# Patient Record
Sex: FEMALE | Race: WHITE | Employment: FULL TIME | URBAN - METROPOLITAN AREA
[De-identification: names, ages, dates, MRNs, and addresses within clinical notes are randomized per-mention and may not be internally consistent; named-entity substitution may affect disease eponyms.]

---

## 2018-08-06 PROBLEM — IMO0001 UNCONTROLLED TYPE 1 DIABETES MELLITUS WITHOUT COMPLICATION: Status: ACTIVE | Noted: 2018-08-06

## 2018-08-06 NOTE — PROGRESS NOTES
Name: Celine Nicolas  Date: 8/6/2018    Referring Physician: No ref. provider found    HISTORY OF PRESENT ILLNESS   Celine Nicolas is a 24year old female who presents for diabetes mellitus, diagnosed at age 10.   She was previously followed by Dr. Sammy Rivera Social History    Marital status: Unknown             Spouse name:                       Years of education:                 Number of children:               Social History Main Topics    Smoking status: Never Smoker counting  -Discussed treatment options to decrease insulin requirement but significant GI side effects with GLP-1  -Would not recommend SGLT-2 until BG levels are better controlled  -Adjusted insulin pump as noted above  -Check low dose dexamethasone suppr

## 2018-08-14 NOTE — TELEPHONE ENCOUNTER
From: Kory Pond  To: Cliff Niño MD  Sent: 8/14/2018 8:58 AM CDT  Subject: Referral Request    I believe I may be pregnant, and I need a referral for an obstetrician/gynecologist that you feel will be able to best serve me as a type 1 diabetic.  I

## 2018-08-14 NOTE — TELEPHONE ENCOUNTER
Spoke with patient. She took two at home pregnancy tests yesterday and both positive. She is wearing 670G pump with sensor. She was just in for visit with [facility] 8/6.  Discussed that she will likely need return visit this week with MD or RN to evaluate BG's and

## 2018-08-15 NOTE — TELEPHONE ENCOUNTER
Please call her and tell her she needs to turn off auto mode and come in for visit ASAP to adjust pump. I will look into Ob/Gyn. Thanks.

## 2018-08-15 NOTE — TELEPHONE ENCOUNTER
Called the patient. She will turn off automode on her pump. Booked appt for tomorrow. May need to provide note for her work at the appt.

## 2018-08-16 NOTE — PROGRESS NOTES
Name: Gloria Evangelista  Date: 8/16/2018    Referring Physician: No ref. provider found    HISTORY OF PRESENT ILLNESS   Gloria Smoker is a 24year old female who presents for diabetes mellitus, diagnosed at age 10.   She was previously followed by Dr. Smita Arango (ZOFRAN) 4 mg tablet, Take 1 tablet by mouth every 4 (four) hours as needed. , Disp: , Rfl:   •  dexamethasone 1 MG Oral Tab, Take 1 tablet (1 mg total) by mouth daily. , Disp: 1 tablet, Rfl: 0     Allergies:   No Known Allergies    Social History:   Social complications of diabetes include retinopathy, neuropathy, nephropathy and cardiovascular disease  -Discussed importance of SBGM  -Discussed importance of CHO counting  -Adjusted insulin pump as noted above  -Discussed importance of f/u with gynecology   -

## 2018-08-22 NOTE — TELEPHONE ENCOUNTER
Reports reviewed by [facility] Randle Lalita changed as follows:    Basals    12A- 1.70-->1.80  4A- 1.80-->1.95  10A- 1.60-->1.80  4P- 1.50-->1.60  6P- 1.85-->2.00  8P- 1.65    I:INDRA   12A- 4.5  11A- 3.5  5P- 3.5      Emailed patient with changes and advise she contact

## 2018-09-06 NOTE — TELEPHONE ENCOUNTER
Not sure about lab location I can direct her to look up [facility] labs to see if closer see other requests regarding testing.

## 2018-09-06 NOTE — TELEPHONE ENCOUNTER
I thought she was pregnant? If yes then we should not check her estrogen level. Also lets get a pump download to look at her sugars. Ok to check thyroid US. Thanks.

## 2018-09-06 NOTE — TELEPHONE ENCOUNTER
From: David Burgess  To:  Harriet Hillman MD  Sent: 9/6/2018 2:31 PM CDT  Subject: Test Results Question    Dr. Taym Markham,    I know we ordered a cortisol test that I have yet to take, but I was wondering if I can add an estrogen level test to that blood

## 2018-09-11 NOTE — TELEPHONE ENCOUNTER
From: Nicolas Rogers  To: Daniel Taylor MD  Sent: 9/11/2018 9:06 AM CDT  Subject: Non-Urgent Medical Question    I have uploaded to [facility]. Please let me know if you recommend any changes - I have been experiencing some lows in the middle of the night.

## 2018-10-09 NOTE — TELEPHONE ENCOUNTER
Printed patient's data from [facility] and left on [facility] desk for review.

## 2018-10-09 NOTE — TELEPHONE ENCOUNTER
From: Mary Jane Locke  To: Beverly Clinton MD  Sent: 10/9/2018 1:19 PM CDT  Subject: Visit Follow-up Question    Hello!   I have uploaded to [employer], but please note I forgot to bolus several times recently - and that should explain the couple of out-of-no-

## 2018-10-10 NOTE — TELEPHONE ENCOUNTER
Pt has current orders to be seen at [facility], pt is requesting new orders for [facility] for US Thyroid :[employer] and  [employer] for [employer] on [address] [facility], \[facility] [place] [facility]\"" call at:[phone],thanks.

## 2018-10-10 NOTE — TELEPHONE ENCOUNTER
Spoke with patient to clarify. She is requesting mailed copies of lab orders for [facility] and thyroid US order. Will also fax orders to [address] [place] [address] in [facility] as requested.

## 2018-10-22 NOTE — PROGRESS NOTES
US of the Head and NEck indicate that there is an mildly enlarged right thyroid by size and line and a normal Left thyroid Possible mild/early goiter. Recommendations are for follow up Ultrasound in one year, no biopsy at this time.  The patient was notifie

## 2018-10-24 NOTE — PROGRESS NOTES
Good Morning Zoran Steen,    I reviewed your Insulin pump download with Dr Ernie Sanchez and suggest the following changes. 1. Decrease your Active Insulin Time to 2:30 (from 3:00 currently)    2.  Decrease the 11:00 Carbohydrate Ratio from 3.5 to 3   Keep the 0:00 a

## 2018-10-26 NOTE — TELEPHONE ENCOUNTER
See patient message below. Camden Hedrick I do see note on thyroid report that you released results to [employer] for patient for thyroid ultrasound but I couldn't find the exact message. What was interpretation of scan and I can let patient know. Thanks.

## 2018-10-31 NOTE — TELEPHONE ENCOUNTER
Patient message. She was requesting thyroid levels which have been ordered and estrogen but she mentioned also cortisol and I don't see that ordered does she need cortisol level? Will print pump report also as requested.

## 2018-10-31 NOTE — PROGRESS NOTES
I have ordered the Thyroid panel and Estradiol labs, I do not see a cortisol test from Dr Wu Petersen.    Thank you

## 2018-11-07 NOTE — PROGRESS NOTES
Name: Mark Yeung  Date: 11/7/2018    Referring Physician: No ref. provider found    HISTORY OF PRESENT ILLNESS   Mark Yeung is a 34year old female who presents for diabetes mellitus, diagnosed at age 10.   She was previously followed by Dr. Dede Mejia daily., Disp: 1 tablet, Rfl: 0     Allergies:   No Known Allergies    Social History:   Social History    Socioeconomic History      Marital status: Single      Spouse name: Not on file      Number of children: Not on file      Years of education: Not on f 1. Diabetes Mellitus Type 1, controlled  -controlled, HgA1c 6.8%   -Congratulated patient on significantly improved BG levels   -Discussed importance of glycemic control to prevent fetal complications and macrosomia   -Discussed importance of glycemic

## 2019-01-02 NOTE — TELEPHONE ENCOUNTER
Current Outpatient Medications:  HUMALOG 100 UNIT/ML Subcutaneous Solution Inject 60-70 Units into the skin daily.  Disp: 70 mL Rfl: 1     Med not covered preferred is Novolin pls call [phone] for PA Pt ID# [id number]

## 2019-01-02 NOTE — TELEPHONE ENCOUNTER
From: Marisol Mcneill  To: Eriberto Abernathy MD  Sent: 1/2/2019 4:19 PM CST  Subject: Prescription Question    I need to fill my insulin prescription as soon as possible. The only insulin I have is currently in my pump.      I have been working all day to get

## 2019-01-02 NOTE — TELEPHONE ENCOUNTER
Spoke with patient. 1) She states she uses up to 100 units of Humalog daily. Requires new script sent to [facility]. Called in script. Pharmacy states Novolog is preferred brand now with patient's new insurance.      Patient declines to switch Rx to [facility] [employer]

## 2019-01-02 NOTE — TELEPHONE ENCOUNTER
Pt requesting refill for rx:Humalog and rx:Test Strips, 90 day supply, pt just switched to [employer], update ins, pls call at:[phone],thanks.   *confirmed default [facility]/pharm    Current Outpatient Medications:   •  HUMALOG 100 UNIT/ML Subcutaneous Solut

## 2019-01-03 NOTE — TELEPHONE ENCOUNTER
Patient contacted office per message below. Will contact insurance regarding PA with new information. \"From: Shanel Cosby  To:  Charity Forde MD  Sent: 1/3/2019  8:02 AM CST  Subject: Prescription Question    I was not able to reply to the Prior Aut

## 2019-01-03 NOTE — TELEPHONE ENCOUNTER
[employer] [facility] contacted and stts that clinical review will need additional information sent in fax.  Fax sent with 11/7/18 OV noting Metformin and Victoza intolerance, and cover letter with information (pt maybe pregnant) SX#[id number], pt name

## 2019-01-03 NOTE — TELEPHONE ENCOUNTER
Per covermymeds, Humalog approved. Effective from 01/02/2019 through 01/02/2020. Sent message to patient letting her know. SH - patient has follow up scheduled 2/6/19. Ok to keep given pos preg test today - or does she need to be seen sooner?

## 2019-01-07 NOTE — TELEPHONE ENCOUNTER
Contacted plan to get authorization for contour next test strips. Plan will be faxing form to our office. Appears contour next strips are covered but she needs PA for the quantity.  Will await form

## 2019-01-07 NOTE — TELEPHONE ENCOUNTER
Spoke with patient. She plans to terminate pregnancy. States she has an appt on Friday with OB. She has follow up with [facility] on 2/6/19.

## 2019-01-09 NOTE — TELEPHONE ENCOUNTER
RN called [employer] - we did not receive fax for quantity override. They will refax form. Will await form. Patient notified.

## 2019-02-06 NOTE — PROGRESS NOTES
Name: Irineo De Los Santos  Date: 2/6/2019    Referring Physician: No ref. provider found    HISTORY OF PRESENT ILLNESS   Irineo De Los Santos is a 34year old female who presents for diabetes mellitus, diagnosed at age 10.   She was previously followed by Dr. Shahrzad Ozuna Solution, Inject 60-70 Units into the skin daily. , Disp: 70 mL, Rfl: 1  •  Ondansetron HCl (ZOFRAN) 4 mg tablet, Take 1 tablet by mouth every 4 (four) hours as needed. , Disp: , Rfl:   •  Pantoprazole Sodium 40 MG Oral Tab EC, Take 40 mg by mouth daily.   , improved BG levels   -Overall sugars continue to improve with increased use of auto mode   -Discussed importance of glycemic control to prevent complications of diabetes  -Discussed complications of diabetes include retinopathy, neuropathy, nephropathy and

## 2019-02-12 NOTE — TELEPHONE ENCOUNTER
From: Allyson Capellan  To: Vandana Romeo MD  Sent: 2/12/2019 9:25 AM CST  Subject: Visit Follow-up Question    I have uploaded my numbers to [email or web address] for review.     I was not able to have the CGM on as many days as I would like since our visit, but it is

## 2019-02-12 NOTE — TELEPHONE ENCOUNTER
C/o hypoglycemia since starting phentermine 2/11/19. BGs sent via [facility] - will bring to [facility] on Wed for you to review.

## 2019-02-13 NOTE — PROGRESS NOTES
Download reviewed with Dr Ever Bhardwaj  Patient notified by Mychart of the following pump changes     Basal rate changes  00:00 1.55 --- Decrease to 1.45  20:00  1.55----Decrease to 1.45  Remaining  basal rates unchanged      Carbohydrate ratio   17:00 2.5----->D

## 2019-03-19 NOTE — TELEPHONE ENCOUNTER
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  Kayleigh Purvis is very strict about electronic devices in the testing center, so I am going to line up an \

## 2019-03-20 NOTE — TELEPHONE ENCOUNTER
Pt requesting testing accommodations letter for DM1 - advised pt letter would be provided 3/21.     Routed to [facility] - please advise

## 2019-05-08 NOTE — PROGRESS NOTES
Name: Zaki Montgomery  Date: 5/8/2019    Referring Physician: No ref. provider found    HISTORY OF PRESENT ILLNESS   Zaki Montgomery is a 34year old female who presents for diabetes mellitus, diagnosed at age 10.   She was previously followed by Dr. Aly Resides 8-10 times daily. , Disp: 600 each, Rfl: 1  •  HUMALOG 100 UNIT/ML Subcutaneous Solution, Inject 60-70 Units into the skin daily. , Disp: 70 mL, Rfl: 1  •  Pantoprazole Sodium 40 MG Oral Tab EC, Take 40 mg by mouth daily.   , Disp: , Rfl:   •  Ondansetron HCl 1, controlled  -controlled, HgA1c 6.8% -->improved  -Congratulated patient on significantly improved BG levels   -Overall sugars continue to improve with increased use of auto mode   -Discussed importance of glycemic control to prevent complications of fifi

## 2019-06-21 NOTE — TELEPHONE ENCOUNTER
From: Gildardo Berg  To:  Charly Wang MD  Sent: 6/20/2019 12:28 PM CDT  Subject: Non-Urgent Medical Question    Hi Dr. Gautam Barahona,  I have just uploaded my numbers to [facility] and would like us to adjust my manual mode rates and ratios to better reflect [facility]

## 2019-06-21 NOTE — TELEPHONE ENCOUNTER
Please see pump download on your desk and read [facility] below.  thanks

## 2019-06-27 NOTE — TELEPHONE ENCOUNTER
Attempted to call pt to verify she received the [facility] msg but mailbox is full.

## 2019-08-14 NOTE — PROGRESS NOTES
Name: Su Gary  Date: 8/14/2019    Referring Physician: No ref. provider found    HISTORY OF PRESENT ILLNESS   Su Gary is a 34year old female who presents for diabetes mellitus, diagnosed at age 10.   She was previously followed by Dr. Aureliano Guerrero by mouth every morning., Disp: 30 capsule, Rfl: 2  •  Glucose Blood (CONTOUR NEXT TEST) In Vitro Strip, Check sugars 8-10 times daily. , Disp: 600 each, Rfl: 1  •  Pantoprazole Sodium 40 MG Oral Tab EC, Take 40 mg by mouth daily.   , Disp: , Rfl:   •  Ondans on significantly improved BG levels   -Overall sugars continue to improve with increased use of auto mode   -Discussed importance of glycemic control to prevent complications of diabetes  -Discussed complications of diabetes include retinopathy, neuropathy

## 2019-11-20 NOTE — PROGRESS NOTES
Name: Irving Cruz  Date: 11/20/2019    Referring Physician: No ref. provider found    HISTORY OF PRESENT ILLNESS   Irving Cruz is a 34year old female who presents for diabetes mellitus, diagnosed at age 10.   She was previously followed by Dr. Caterina Pang morning., Disp: 30 capsule, Rfl: 2  •  Insulin Lispro (HUMALOG) 100 UNIT/ML Subcutaneous Solution, INJECT UP  UNITS EVERY DAY, Disp: 90 mL, Rfl: 1  •  Glucose Blood (CONTOUR NEXT TEST) In Vitro Strip, Check sugars 8-10 times daily. , Disp: 600 each, R Type 1, controlled  -controlled, HgA1c 6.8% -->improved   -Congratulated patient on significantly improved BG levels   -Overall sugars continue to improve with increased use of auto mode   -Discussed importance of glycemic control to prevent complications

## 2019-12-09 NOTE — TELEPHONE ENCOUNTER
From: Allyson Capellan  To: Quique Han MD  Sent: 12/9/2019 8:32 AM CST  Subject: Prescription Question    Hi Dr. Dannielle Griffin,     I received this letter on Friday. Can you help me get my Humalog prescription approved for another year?  I would hate to lose all

## 2020-03-02 NOTE — TELEPHONE ENCOUNTER
bre    Current Outpatient Medications:    •  Insulin Lispro (HUMALOG) 100 UNIT/ML Subcutaneous Solution, INJECT UP  UNITS EVERY DAY, Disp: 90 mL, Rfl: 1

## 2020-03-30 NOTE — TELEPHONE ENCOUNTER
From: Shanel Cosby  To: Nicky Radford MD  Sent: 3/30/2020 12:15 PM CDT  Subject: Non-Urgent Medical Question    Hi Dr. 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

## 2020-06-22 NOTE — TELEPHONE ENCOUNTER
Prescription refill request:    LOV 11/20/19. Follow up 8/10/20. Refilled prescription per protocol.

## 2020-06-22 NOTE — TELEPHONE ENCOUNTER
From: Parvez Acevedo  To: Juli Patel MD  Sent: 6/22/2020 11:16 AM CDT  Subject: Prescription Question    My [facility] does not have the prescription for my test strips that was renewed in May - can you send it again so I can refill?     Thanks,  Shanique Hernandez

## 2020-07-10 NOTE — TELEPHONE ENCOUNTER
From: Celine Nicolas  To: Tarsha Denise MD  Sent: 7/10/2020 12:40 PM CDT  Subject: Prescription Question    Hi Dr. Brent Valadez & Team - I realized yesterday that my glucagon is . Can you send a new script to [facility] so I can pick it up asap?  I am going

## 2020-07-14 NOTE — TELEPHONE ENCOUNTER
Received fax 7/2/20 from [employer] requesting medical records. Faxed to [phone]. C-peptide was never drawn, so unable to send. Letter of Medical Necessity was not faxed to office. Note on fax cover sheet, requesting letter to be faxed to office.

## 2020-07-28 NOTE — TELEPHONE ENCOUNTER
LMTCB, Sent [employer] message. Need to confirm which pump model pt is on prior to sending fax.

## 2020-08-10 NOTE — PROGRESS NOTES
Name: Jess Patel  Date: 8/10/2020    Referring Physician: No ref. provider found    HISTORY OF PRESENT ILLNESS   Jess Patel is a 34year old female who presents for diabetes mellitus, diagnosed at age 10.   She was previously followed by Dr. Arabella Osborne Number of children: Not on file      Years of education: Not on file      Highest education level: Not on file    Tobacco Use      Smoking status: Never Smoker      Smokeless tobacco: Never Used    Substance and Sexual Activity      Alcohol use:  Yes 8/10/2020  Bashir Kaplan MD

## 2020-08-24 NOTE — TELEPHONE ENCOUNTER
Spoke with Kiko Alejandro from [employer] and notified her that CMN was faxed just last week Thursday on 08/20/20. She states normally they also scan the form and maybe it has not been scanned yet.   They will follow up on it and will reach out to the patient if CMN

## 2020-08-24 NOTE — TELEPHONE ENCOUNTER
From: Doe Garcia  To: Jeff Mejia MD  Sent: 8/20/2020 12:48 PM CDT  Subject: Prescription Question    Hi all,    I have received a few emails from [employer] saying they are working with you to get a Certificate of Medical Necessity for one of my ord

## 2020-08-27 NOTE — TELEPHONE ENCOUNTER
Hubert Nurse from [address] [place] [address] states they did not receive the CMN form can you please re fax # [phone]

## 2020-08-28 NOTE — TELEPHONE ENCOUNTER
CMN form not received the first time but was re faxed yesterday morning. I contacted [employer] and confirmed the form has been received.

## 2020-08-28 NOTE — TELEPHONE ENCOUNTER
From: Celine Nicolas  To: Tarsha Denise MD  Sent: 8/27/2020 10:54 AM CDT  Subject: Prescription Question    Hi again! I received another email from [employer] saying they need a Certificate of Medical Necessity from you.  Did they reject the one you sent

## 2020-11-04 NOTE — TELEPHONE ENCOUNTER
•  Glucose Blood (CONTOUR NEXT TEST) In Vitro Strip, CHECK BLOOD SUGAR 8-10 TIMES DAILY, Disp: 600 strip, Rfl: 0

## 2021-01-21 NOTE — TELEPHONE ENCOUNTER
From: Jess Patel  To: Lynn Park MD  Sent: 1/21/2021 9:43 AM CST  Subject: Visit Follow-up Question    Hi Dr. Luisito Smith & Staff! I hope you are staying safe!  I have an appointment on 2/10, and I wanted to check to see if I should plan on getting blood

## 2021-02-10 NOTE — PROGRESS NOTES
Name: Doe Garcia  Date: 2/10/2021    Referring Physician: No ref. provider found    HISTORY OF PRESENT ILLNESS   Doe Garcia is a 31year old female who presents for diabetes mellitus, diagnosed at age 10.   She was previously followed by Dr. Reymundo Colon Socioeconomic History      Marital status:       Spouse name: Not on file      Number of children: Not on file      Years of education: Not on file      Highest education level: Not on file    Tobacco Use      Smoking status: Never Smoker      Smoke

## 2021-02-11 NOTE — TELEPHONE ENCOUNTER
From: Eulalia Thomas  To: Hortencia Huber MD  Sent: 2/11/2021 12:43 PM CST  Subject: Visit Follow-up Question    Hi - the website won't let me respond directly to the message about the physician form - but I found out how you can send it.  Please fax to [phone]-

## 2021-02-11 NOTE — TELEPHONE ENCOUNTER
Patient left form to be filled out : [facility] physician lab from to be filled by our physician.   Done   rn called patient left mess and sent mychart asking what we need to do with form

## 2021-03-17 NOTE — TELEPHONE ENCOUNTER
•  Insulin Lispro (HUMALOG) 100 UNIT/ML Subcutaneous Solution, INJECT UP  UNITS UNDER THE SKIN EVERY DAY, Disp: 90 mL, Rfl: 1    Pharmacy comments: the following pharmacist-recommended medication therapy changes are being requested on behalf of t

## 2021-03-17 NOTE — TELEPHONE ENCOUNTER
Spoke to pharmacy regarding message below - per pharmacy, patient has not picked RX sent 3/8/21 - pharmacist states humalog is covered by patient's insurance and cost of humalog is d/t deductible

## 2021-06-30 NOTE — TELEPHONE ENCOUNTER
Pharmacy refill request for:      •  Glucose Blood (CONTOUR NEXT TEST) In Vitro Strip, CHECK BLOOD SUGAR 8-10 TIMES DAILY, Disp: 600 strip, Rfl: 0      MESSAGE:   Plan does not cover this medication.  Please call plan at [phone] to initiate prior autho

## 2021-07-01 NOTE — TELEPHONE ENCOUNTER
PA approved. Sanchez, [phone]. Spoke with Edmundo/pharmacist.  Went through.    My chart message sent to patient of approval.

## 2021-07-01 NOTE — TELEPHONE ENCOUNTER
Medication PA Requested:  Glucose Blood (CONTOUR NEXT TEST) In Vitro Strip, Disp: 600 strip, Rfl: 0             CoverMyMeds Used:  Key: [id number]  Sig:CHECK BLOOD SUGAR 8-10 TIMES DAILY   DX Code: E10.9           Submitted with office note and A1c of 2/10/2

## 2021-07-01 NOTE — TELEPHONE ENCOUNTER
Medication PA Requested:  Glucose Blood (CONTOUR NEXT TEST) In Vitro Strip, Disp: 600 strip, Rfl: 0                                             CoverMyMeds Used:  Key:  Sig:CHECK BLOOD SUGAR 8-10 TIMES DAILY   DX Code: E10.9

## 2021-07-02 NOTE — TELEPHONE ENCOUNTER
From: Nicolas Rogers  To: Rubio Luis MD  Sent: 7/2/2021 1:06 PM CDT  Subject: Prescription Question    I received this message: Good Afternoon Ms. Peña,   We contacted your benefit plan and received approval of prior authorization for Contour Next Test

## 2021-07-26 NOTE — TELEPHONE ENCOUNTER
Form received regarding CMN for pump replacement and CGM. Completed and placed on provider's desk for signature.

## 2021-07-27 NOTE — TELEPHONE ENCOUNTER
Spoke to patient in regard to insulin pump below.     Basal:  12A 0.975 -->1.05  4A 1.60 -->1.70  10A 1.40 -->1.50  4P 1.30 -->1.40  8P 1.00 -->1.10     Bolus:  I:CR   12A 6.5  11A 5.0 -->5.5  5P 5.0      Sensitivity  12A 24  9P 28     Blood Glucose Target

## 2021-07-27 NOTE — TELEPHONE ENCOUNTER
Pt is out of Insulin and is calling to check status on the pens for back up.  Please call pt before end of day  if does not get insulin will have to go to ER

## 2021-07-27 NOTE — TELEPHONE ENCOUNTER
Pt is on vacation now and states her pump broke - asking for rx for insulin pens and needles to be sent to [facility] in [facility] - phone [phone]

## 2021-07-27 NOTE — TELEPHONE ENCOUNTER
Hi!  I made a change. The total basal is 33 units. Thank you! Please ask her to send us her blood sugars after a couple of days, and let us trouble shoot her pump issue. Thank you!

## 2021-07-27 NOTE — TELEPHONE ENCOUNTER
Dolly Chen Body    Can you please review my insulin conversion math prior to sending in prescription.  Calculations based off of Dr. Samia Bolaños note 2/10/21

## 2021-07-27 NOTE — TELEPHONE ENCOUNTER
Called patient and relayed below message from Dr. Racheal Dasilva as outlined. However, upon looking at the script sent it had a direction to inject 40 units daily. Discussed  with on call provider to clarify.   Received the following recommendation:     \"S

## 2021-08-18 NOTE — TELEPHONE ENCOUNTER
Patient was scheduled to see Dr. Charissa Brambila in [address] [place] [address] office but wasn't due to power outage. Please call to reschedule, no opening slots available.

## 2021-08-25 NOTE — PROGRESS NOTES
Name: Irineo De Los Santos  Date: 8/25/2021    Referring Physician: No ref. provider found    HISTORY OF PRESENT ILLNESS   Irineo De Los Santos is a 31year old female who presents for diabetes mellitus, diagnosed at age 10.   She was previously followed by Dr. Vinod Hughes (LANTUS SOLOSTAR) 100 UNIT/ML Subcutaneous Solution Pen-injector, Inject 40 Units into the skin nightly.  (Patient not taking: Reported on 8/25/2021 ), Disp: 12 mL, Rfl: 0  •  Glucose Blood (CONTOUR NEXT TEST) In Vitro Strip, CHECK BLOOD SUGAR 8-10 TIMES DA importance of CHO counting  -Adjusted insulin pump as noted above   -Normal lipids  -Normal foot exam performed today  -Normal thyroid levels   -UTD with optho  -Normal renal and liver function  -Normotensive  -She is interested in fertility and discussed

## 2021-11-30 NOTE — PROGRESS NOTES
HPI:   Marino Villa is a 36year old female who presents for a complete physical exam.    Patient is new to me. Sees Dr. Hermelindo Myers for type 1 diabetes. Patient reports that is very well controlled using insulin pump.   She and her  are trying to ge Use      Vaping Use: Never used    Alcohol use: Yes      Comment: rarely    Drug use: Yes      Types: Cannabis         REVIEW OF SYSTEMS:   GENERAL: feels well otherwise  SKIN: denies any skin lesions  EYES:denies blurred vision or double vision  HEENT: de MICROALB/CREAT RATIO, RANDOM URINE;  Future  - TSH W REFLEX TO FREE T4; Future  - VITAMIN D, 25-HYDROXY  - CBC WITH DIFFERENTIAL WITH PLATELET  - COMP METABOLIC PANEL (14)  - LIPID PANEL  - MICROALB/CREAT RATIO, RANDOM URINE  - TSH W REFLEX TO FREE T4

## 2022-01-12 NOTE — PROGRESS NOTES
Name: Tamir Pollard  Date: 1/12/2022    Referring Physician: No ref. provider found    HISTORY OF PRESENT ILLNESS   Tamir Pollard is a 36year old female who presents for diabetes mellitus, diagnosed at age 10.      She is now 5 and 1/2 weeks pregnant reported), Disp: 3 mL, Rfl: 0  •  Insulin Pen Needle (CAREFINE PEN NEEDLES) 32G X 5 MM Does not apply Misc, Use to inject insulin as directed, Disp: 100 each, Rfl: 0  •  insulin glargine (LANTUS SOLOSTAR) 100 UNIT/ML Subcutaneous Solution Pen-injector, Inj SBGM  -Discussed importance of CHO counting  -Adjusted insulin pump as noted above   -Normal lipids  -Normal foot exam performed 8/2021  -Normal thyroid levels   -UTD with optho - discussed new appt during pregnancy   -Normal renal and liver function  -Nor

## 2022-01-24 NOTE — TELEPHONE ENCOUNTER
From: Jordan England  To: Kristina Keita MD  Sent: 1/21/2022 11:07 AM CST  Subject: Veryl Kinds upload    Hi Dr. Saul Granados,    I have uploaded my [employer] data for you to review. Let me know if it did not work or if you have any changes for me. Thanks!   Eric Lipscomb

## 2022-01-24 NOTE — TELEPHONE ENCOUNTER
Dr. Unique Zazueta    Please advise if you would like us to print download for you. Next office visit 2/2/22.

## 2022-04-28 NOTE — TELEPHONE ENCOUNTER
From: Emma Phillips  To: Charlynn Fothergill, MD  Sent: 4/28/2022 12:55 PM CDT  Subject: Test Strips Prescription    Hello,    I am going to run out of test strips early this month. I have been experiencing a lot more lows and so I have needed to test a lot more lately. Is there a way for you to help me request a refill early? I sent it to [facility] for refill and it told me it was too early for the insurance, but I am on my last 100 strips and it says I can't refill until 5/30. I will go through that in 10 days or so. Thanks!!   Devante Castle

## 2022-04-28 NOTE — TELEPHONE ENCOUNTER
PA team, please submit for testing supplies, thank you!     Medication PA Requested: Accu-Check Guide Testing Strips                                                        CoverMyMeds Used:  Key:  Quantity: 900  Day Supply: 90 days  Sig: Check Blood Sugar 10 times daily  DX Code:  E10.9                                   CPT code (if applicable):   Case Number/Pending Ref#:  Rationale:  - Pt has type 1 diabetes  - Pt on insulin pump which insulin can cause low blood sugar  - Pt has been checking blood sugar 10 times per day due to fluctuating blood sugars  - Pt has a history of low blood sugar requiring frequent checking  - A1c of 6.2% on 1/12/2022

## 2022-04-28 NOTE — TELEPHONE ENCOUNTER
Spoke with pharmacist. Saadia Leroy was preferred. Does not require PA. Costs around $300 for 90 day supply. Without being covered, cost around $600. Related to high deductible plan. Responded to pt's mychart message. Additionally, offered [employer] as a cheaper option.     Of note, pharmacist updated signature to 10 times per day

## 2022-05-11 ENCOUNTER — OFFICE VISIT (OUTPATIENT)
Dept: ENDOCRINOLOGY CLINIC | Facility: CLINIC | Age: 32
End: 2022-05-11
Payer: COMMERCIAL

## 2022-05-11 VITALS
BODY MASS INDEX: 34 KG/M2 | DIASTOLIC BLOOD PRESSURE: 82 MMHG | HEART RATE: 74 BPM | SYSTOLIC BLOOD PRESSURE: 121 MMHG | WEIGHT: 232 LBS

## 2022-05-11 DIAGNOSIS — E10.65 UNCONTROLLED TYPE 1 DIABETES MELLITUS WITH HYPERGLYCEMIA (HCC): Primary | ICD-10-CM

## 2022-05-11 LAB
CARTRIDGE LOT#: ABNORMAL NUMERIC
GLUCOSE BLOOD: 146
HEMOGLOBIN A1C: 6.1 % (ref 4.3–5.6)
TEST STRIP LOT #: NORMAL NUMERIC

## 2022-05-11 PROCEDURE — 99213 OFFICE O/P EST LOW 20 MIN: CPT | Performed by: INTERNAL MEDICINE

## 2022-05-11 PROCEDURE — 3074F SYST BP LT 130 MM HG: CPT | Performed by: INTERNAL MEDICINE

## 2022-05-11 PROCEDURE — 36416 COLLJ CAPILLARY BLOOD SPEC: CPT | Performed by: INTERNAL MEDICINE

## 2022-05-11 PROCEDURE — 82947 ASSAY GLUCOSE BLOOD QUANT: CPT | Performed by: INTERNAL MEDICINE

## 2022-05-11 PROCEDURE — 3079F DIAST BP 80-89 MM HG: CPT | Performed by: INTERNAL MEDICINE

## 2022-05-11 PROCEDURE — 3044F HG A1C LEVEL LT 7.0%: CPT | Performed by: INTERNAL MEDICINE

## 2022-05-11 PROCEDURE — 83036 HEMOGLOBIN GLYCOSYLATED A1C: CPT | Performed by: INTERNAL MEDICINE

## 2022-06-16 ENCOUNTER — TELEPHONE (OUTPATIENT)
Dept: ENDOCRINOLOGY CLINIC | Facility: CLINIC | Age: 32
End: 2022-06-16

## 2022-06-16 NOTE — TELEPHONE ENCOUNTER
Received fax from [employer]. Attached is a prescription for pump supplies. Placed on provider desk for review and signature.

## 2022-06-17 ENCOUNTER — TELEPHONE (OUTPATIENT)
Dept: ENDOCRINOLOGY CLINIC | Facility: CLINIC | Age: 32
End: 2022-06-17

## 2022-06-17 NOTE — TELEPHONE ENCOUNTER
Medication  CGM  pump supply Requested: Glucose Blood (CONTOUR NEXT TEST) In Vitro Strip    Sig: CHECK BLOOD SUGAR 8-10 TIMES DAILY    DX Code: E10.65                                       Case Number/Pending Ref#:

## 2022-06-17 NOTE — TELEPHONE ENCOUNTER
Glucose Blood (CONTOUR NEXT TEST) In Vitro Strip, CHECK BLOOD SUGAR 8-10 TIMES DAILY, Disp: 600 strip, Rfl: 0    Key: Olive Inch

## 2022-06-20 NOTE — TELEPHONE ENCOUNTER
Medication PA Requested:Glucose Blood (CONTOUR NEXT TEST) In Vitro Strip                                                          CoverMyMeds Used:  Key:Key: [id number]  Quantity:600 strip  Day Supply:  Sig:CHECK BLOOD SUGAR 8-10 TIMES DAILY   DX Code:                                         Faxed [facility] PA form with OV/A1c 5/11/2022 and notation are compatible with pump. Awaiting determination.

## 2022-06-23 RX ORDER — BLOOD SUGAR DIAGNOSTIC
STRIP MISCELLANEOUS
Qty: 900 STRIP | Refills: 1 | Status: SHIPPED | OUTPATIENT
Start: 2022-06-23

## 2022-06-27 ENCOUNTER — PATIENT MESSAGE (OUTPATIENT)
Dept: ENDOCRINOLOGY CLINIC | Facility: CLINIC | Age: 32
End: 2022-06-27

## 2022-06-27 NOTE — TELEPHONE ENCOUNTER
From: Margarita Gauthier  To: Taya Owens MD  Sent: 6/27/2022 10:19 AM CDT  Subject: 5 weeks pregnant    Hi Dr. Lyn Books,  I just got a positive test. I am about 5 weeks 5 days according to my tracking ap. I am going in for blood tests at the gyno today at lunch to test my HcG levels. When do you want to see me? Should we wait a bit to make sure this one sticks? I uploaded to [facility] this morning - let me know if it worked. I am noticing insulin resistance all weekend and have changed my site twice to see if that is the problem with no avail. I keep doing small boluses to try to keep my bs down, but do you have any adjustments to make? Thanks! Keep your fingers crossed for me!   Sheela Breeding

## 2022-06-28 NOTE — TELEPHONE ENCOUNTER
Dr Garay Half,    Please see below message. Additionally, currently, you have an 8:45am on Weds July 13th -- Do you want to double book?

## 2022-06-29 ENCOUNTER — PATIENT MESSAGE (OUTPATIENT)
Dept: ENDOCRINOLOGY CLINIC | Facility: CLINIC | Age: 32
End: 2022-06-29

## 2022-06-30 NOTE — TELEPHONE ENCOUNTER
The upload on [address] is under the last name Mariana. RN confirmed that the two accounts are for this patient by her new and old 's license scanned under media tab. RN updated last name on [employer]. Pump download placed on Dr. Ezra Willson desk.

## 2022-08-08 RX ORDER — INSULIN LISPRO 100 [IU]/ML
INJECTION, SOLUTION INTRAVENOUS; SUBCUTANEOUS
Qty: 90 ML | Refills: 0 | Status: SHIPPED | OUTPATIENT
Start: 2022-08-08

## 2022-08-09 ENCOUNTER — TELEPHONE (OUTPATIENT)
Dept: ENDOCRINOLOGY CLINIC | Facility: CLINIC | Age: 32
End: 2022-08-09

## 2022-08-09 NOTE — TELEPHONE ENCOUNTER
Nettie Hester is calling to ask about the letter for medical necessity needed for the patients pump supply order .   Please fax request [phone]

## 2022-08-12 NOTE — TELEPHONE ENCOUNTER
Shea Watkins is calling back again about the letter for medical necessity for pt pump supplies order    Call back number: [phone] option 2

## 2022-08-20 PROCEDURE — 3061F NEG MICROALBUMINURIA REV: CPT | Performed by: INTERNAL MEDICINE

## 2022-08-24 ENCOUNTER — OFFICE VISIT (OUTPATIENT)
Dept: ENDOCRINOLOGY CLINIC | Facility: CLINIC | Age: 32
End: 2022-08-24
Payer: COMMERCIAL

## 2022-08-24 VITALS
WEIGHT: 235 LBS | HEART RATE: 66 BPM | SYSTOLIC BLOOD PRESSURE: 121 MMHG | BODY MASS INDEX: 35 KG/M2 | DIASTOLIC BLOOD PRESSURE: 78 MMHG

## 2022-08-24 DIAGNOSIS — E10.65 UNCONTROLLED TYPE 1 DIABETES MELLITUS WITH HYPERGLYCEMIA (HCC): Primary | ICD-10-CM

## 2022-08-24 LAB
CARTRIDGE LOT#: ABNORMAL NUMERIC
GLUCOSE BLOOD: 136
HEMOGLOBIN A1C: 6.5 % (ref 4.3–5.6)
TEST STRIP LOT #: NORMAL NUMERIC

## 2022-08-24 PROCEDURE — 36416 COLLJ CAPILLARY BLOOD SPEC: CPT | Performed by: INTERNAL MEDICINE

## 2022-08-24 PROCEDURE — 3044F HG A1C LEVEL LT 7.0%: CPT | Performed by: INTERNAL MEDICINE

## 2022-08-24 PROCEDURE — 83036 HEMOGLOBIN GLYCOSYLATED A1C: CPT | Performed by: INTERNAL MEDICINE

## 2022-08-24 PROCEDURE — 99214 OFFICE O/P EST MOD 30 MIN: CPT | Performed by: INTERNAL MEDICINE

## 2022-08-24 PROCEDURE — 3078F DIAST BP <80 MM HG: CPT | Performed by: INTERNAL MEDICINE

## 2022-08-24 PROCEDURE — 82947 ASSAY GLUCOSE BLOOD QUANT: CPT | Performed by: INTERNAL MEDICINE

## 2022-08-24 PROCEDURE — 3074F SYST BP LT 130 MM HG: CPT | Performed by: INTERNAL MEDICINE

## 2022-09-13 ENCOUNTER — TELEPHONE (OUTPATIENT)
Dept: ENDOCRINOLOGY CLINIC | Facility: CLINIC | Age: 32
End: 2022-09-13

## 2022-09-13 NOTE — TELEPHONE ENCOUNTER
Received fax from [facility]. Completed the attached CMN for pump supplies and placed on providers desk for signature. Attached recent chart notes also.

## 2022-10-31 RX ORDER — BLOOD SUGAR DIAGNOSTIC
STRIP MISCELLANEOUS
Qty: 900 STRIP | Refills: 1 | Status: SHIPPED | OUTPATIENT
Start: 2022-10-31

## 2022-11-10 RX ORDER — INSULIN LISPRO 100 [IU]/ML
110 INJECTION, SOLUTION INTRAVENOUS; SUBCUTANEOUS DAILY
Qty: 90 ML | Refills: 1 | Status: SHIPPED | OUTPATIENT
Start: 2022-11-10

## 2022-11-10 NOTE — TELEPHONE ENCOUNTER
LOV: 8/24/22    Future Appointments   Date Time Provider Jerry Majano   12/21/2022 11:45 AM Angely Douglas MD [facility]

## 2022-12-21 ENCOUNTER — OFFICE VISIT (OUTPATIENT)
Dept: ENDOCRINOLOGY CLINIC | Facility: CLINIC | Age: 32
End: 2022-12-21
Payer: COMMERCIAL

## 2022-12-21 VITALS — BODY MASS INDEX: 34 KG/M2 | WEIGHT: 230 LBS

## 2022-12-21 DIAGNOSIS — E78.5 HYPERLIPIDEMIA, UNSPECIFIED HYPERLIPIDEMIA TYPE: ICD-10-CM

## 2022-12-21 DIAGNOSIS — E10.65 UNCONTROLLED TYPE 1 DIABETES MELLITUS WITH HYPERGLYCEMIA (HCC): Primary | ICD-10-CM

## 2022-12-21 LAB
CARTRIDGE LOT#: ABNORMAL NUMERIC
GLUCOSE BLOOD: 87
HEMOGLOBIN A1C: 6.1 % (ref 4.3–5.6)
TEST STRIP LOT #: NORMAL NUMERIC

## 2022-12-21 PROCEDURE — 3044F HG A1C LEVEL LT 7.0%: CPT | Performed by: INTERNAL MEDICINE

## 2022-12-21 PROCEDURE — 82947 ASSAY GLUCOSE BLOOD QUANT: CPT | Performed by: INTERNAL MEDICINE

## 2022-12-21 PROCEDURE — 99214 OFFICE O/P EST MOD 30 MIN: CPT | Performed by: INTERNAL MEDICINE

## 2022-12-21 PROCEDURE — 83036 HEMOGLOBIN GLYCOSYLATED A1C: CPT | Performed by: INTERNAL MEDICINE

## 2023-04-28 ENCOUNTER — PATIENT MESSAGE (OUTPATIENT)
Dept: ENDOCRINOLOGY CLINIC | Facility: CLINIC | Age: 33
End: 2023-04-28

## 2023-04-28 NOTE — TELEPHONE ENCOUNTER
From: Spencer Gauthier  To: Charlynn Fothergill, MD  Sent: 4/28/2023 10:05 AM CDT  Subject: 5 Weeks Pregnant    Hi Dr. Jaja Conner,    I am pregnant again - about 5 weeks. Just wanted you to know. I will go in for bloodwork monday at the gyno. Do you want to see me or should I just get my pump downloaded to [facility] so you can review? Let me know.     Devante Castle

## 2023-05-01 NOTE — TELEPHONE ENCOUNTER
Pt scheduled 5/31 2pm at [facility] (next avail res 24 spot)    Advised update in 1 week

## 2023-05-01 NOTE — TELEPHONE ENCOUNTER
Dr Sandoval Arriola,    Please see below. OK with recommendations? You also wanted to discuss when you'd see her for appt. Thanks! Pt currently on [employer] with gaurdian sensor (manual mode).  Reviewed download which overall blood sugars are steady.    - Slight rise between 3-6am; will advise to increase basal during that time to help with more in range fasting glucose  - Meal time represents a quick rise in a blood sugar sometimes reaching 200's mg/dl but does return to target range  within 2 hours; would advise to pre-bolus 15-20 minutes before rather than right before    Recommendations:    Basal  12A 1.4 --> 1.45  3A 1.45 --> 1.55  9A 1.30   12P 1.20  3P 1.15  6P 1.30    ICR  12A 7  10:30A 7.5  3P 6.5    ISF  12A 30  9P 40    Target  12A   4A   8P     Active insulin time: 2.5 hrs

## 2023-06-16 ENCOUNTER — MED REC SCAN ONLY (OUTPATIENT)
Dept: ENDOCRINOLOGY CLINIC | Facility: CLINIC | Age: 33
End: 2023-06-16

## 2023-06-30 ENCOUNTER — TELEPHONE (OUTPATIENT)
Dept: ENDOCRINOLOGY CLINIC | Facility: CLINIC | Age: 33
End: 2023-06-30

## 2023-07-17 ENCOUNTER — PATIENT MESSAGE (OUTPATIENT)
Dept: ENDOCRINOLOGY CLINIC | Facility: CLINIC | Age: 33
End: 2023-07-17

## 2023-07-17 NOTE — TELEPHONE ENCOUNTER
From: Cordelia Gauthier  To: Mari Rodriguez MD  Sent: 7/17/2023 1:30 PM CDT  Subject: [employer] Prescription Update    Hi Dr. Moose Nguyen, I keep getting voicemails and emails from [employer] saying they have contacted you regarding my pump supply prescription refills. Can you please make sure they get what they need?     Thanks, Clary Curtis

## 2023-07-19 ENCOUNTER — OFFICE VISIT (OUTPATIENT)
Dept: ENDOCRINOLOGY CLINIC | Facility: CLINIC | Age: 33
End: 2023-07-19

## 2023-07-19 VITALS
DIASTOLIC BLOOD PRESSURE: 75 MMHG | BODY MASS INDEX: 34 KG/M2 | WEIGHT: 231 LBS | SYSTOLIC BLOOD PRESSURE: 124 MMHG | HEART RATE: 67 BPM

## 2023-07-19 DIAGNOSIS — E10.65 UNCONTROLLED TYPE 1 DIABETES MELLITUS WITH HYPERGLYCEMIA (HCC): Primary | ICD-10-CM

## 2023-07-19 LAB
CARTRIDGE LOT#: ABNORMAL NUMERIC
GLUCOSE BLOOD: 108
HEMOGLOBIN A1C: 6.4 % (ref 4.3–5.6)
TEST STRIP LOT #: NORMAL NUMERIC

## 2023-07-19 PROCEDURE — 3044F HG A1C LEVEL LT 7.0%: CPT | Performed by: INTERNAL MEDICINE

## 2023-07-19 PROCEDURE — 3074F SYST BP LT 130 MM HG: CPT | Performed by: INTERNAL MEDICINE

## 2023-07-19 PROCEDURE — 99214 OFFICE O/P EST MOD 30 MIN: CPT | Performed by: INTERNAL MEDICINE

## 2023-07-19 PROCEDURE — 3078F DIAST BP <80 MM HG: CPT | Performed by: INTERNAL MEDICINE

## 2023-07-19 PROCEDURE — 82947 ASSAY GLUCOSE BLOOD QUANT: CPT | Performed by: INTERNAL MEDICINE

## 2023-07-19 PROCEDURE — 83036 HEMOGLOBIN GLYCOSYLATED A1C: CPT | Performed by: INTERNAL MEDICINE

## 2023-07-19 RX ORDER — PHENTERMINE HYDROCHLORIDE 30 MG/1
30 CAPSULE ORAL EVERY MORNING
Qty: 30 CAPSULE | Refills: 2 | Status: SHIPPED | OUTPATIENT
Start: 2023-07-19

## 2023-10-20 ENCOUNTER — TELEPHONE (OUTPATIENT)
Dept: ENDOCRINOLOGY CLINIC | Facility: CLINIC | Age: 33
End: 2023-10-20

## 2023-10-20 NOTE — TELEPHONE ENCOUNTER
Phentermine HCl 30 MG Oral Cap, Take 1 capsule (30 mg total) by mouth every morning., Disp: 30 capsule, Rfl: 1    Key:  [id number]

## 2023-10-21 ENCOUNTER — TELEPHONE (OUTPATIENT)
Dept: ENDOCRINOLOGY CLINIC | Facility: CLINIC | Age: 33
End: 2023-10-21

## 2023-10-21 NOTE — TELEPHONE ENCOUNTER
ACCU-CHEK GUIDE TEST STRIPS 100S  SIG: USE TO TEST 10 TIMES DAILY  QTY: 900  LAST REFILL: 5/23/23    Per pharmacy plan does not cover this medication. Please call plan at [phone] to initiate a prior auth or call/fax pharmacy to change medication. Patient ID # is [id number].

## 2023-10-24 RX ORDER — BLOOD SUGAR DIAGNOSTIC
STRIP MISCELLANEOUS
Qty: 400 STRIP | Refills: 1 | Status: SHIPPED | OUTPATIENT
Start: 2023-10-24

## 2023-10-24 NOTE — TELEPHONE ENCOUNTER
Per LOV note 7/19/23: patient checking BG 3-4 times daily. Rx updated to 4 times daily and resent to pharmacy.

## 2023-10-25 ENCOUNTER — PATIENT MESSAGE (OUTPATIENT)
Dept: ENDOCRINOLOGY CLINIC | Facility: CLINIC | Age: 33
End: 2023-10-25

## 2023-10-26 ENCOUNTER — PATIENT MESSAGE (OUTPATIENT)
Dept: ENDOCRINOLOGY CLINIC | Facility: CLINIC | Age: 33
End: 2023-10-26

## 2023-10-26 NOTE — TELEPHONE ENCOUNTER
Responded to patient - I do not think accucheck glucometer communicates with [employer] - asking patient to clarify. Test strips were sent on 10/24.

## 2023-10-26 NOTE — TELEPHONE ENCOUNTER
From: May Theodore Gauthier  To: Farideh Hanson  Sent: 10/25/2023 3:31 PM CDT  Subject: Prior Auth Needed - Test Strips    I was told by Wilbert that I should try to get prior authorization for my test strips since my insurance doesn't want to cover it. Did we try to do this already? Can you give me advice? I think I have to use that meter for my pump to connect, right? Thanks!  Colette Garnett

## 2023-10-30 NOTE — TELEPHONE ENCOUNTER
Patient is correct.   She uses the new [employer]onic 780 which is the Accucheck strips - please start PA

## 2023-10-31 NOTE — TELEPHONE ENCOUNTER
LOCATION INFORMATION  [address] [place] [address], [address] [place] [address]  Phone   [phone]  Fax   [phone]    RN faxed lab orders  Pt was made aware

## 2023-11-03 NOTE — TELEPHONE ENCOUNTER
Per [facility], accuchek guide test strips were approved on 10/31/23:     [id number];AOHEHG:[id number]; Review Type:Prior Auth; Coverage Start Date:10/01/2023; Coverage End Date:10/30/2024    MyChart sent.

## 2023-11-05 LAB
ABSOLUTE BASOPHILS: 50 CELLS/UL (ref 0–200)
ABSOLUTE EOSINOPHILS: 310 CELLS/UL (ref 15–500)
ABSOLUTE LYMPHOCYTES: 1779 CELLS/UL (ref 850–3900)
ABSOLUTE MONOCYTES: 552 CELLS/UL (ref 200–950)
ABSOLUTE NEUTROPHILS: 3509 CELLS/UL (ref 1500–7800)
ALBUMIN/GLOBULIN RATIO: 2.1 (CALC) (ref 1–2.5)
ALBUMIN: 4.4 G/DL (ref 3.6–5.1)
ALKALINE PHOSPHATASE: 52 U/L (ref 31–125)
ALT: 11 U/L (ref 6–29)
AST: 13 U/L (ref 10–30)
BASOPHILS: 0.8 %
BILIRUBIN, TOTAL: 0.5 MG/DL (ref 0.2–1.2)
BUN: 13 MG/DL (ref 7–25)
CALCIUM: 9.3 MG/DL (ref 8.6–10.2)
CARBON DIOXIDE: 26 MMOL/L (ref 20–32)
CHLORIDE: 107 MMOL/L (ref 98–110)
CHOL/HDLC RATIO: 3.8 (CALC)
CHOLESTEROL, TOTAL: 178 MG/DL
CREATININE: 0.7 MG/DL (ref 0.5–0.97)
EGFR: 119 ML/MIN/1.73M2
EOSINOPHILS: 5 %
GLOBULIN: 2.1 G/DL (CALC) (ref 1.9–3.7)
GLUCOSE: 177 MG/DL (ref 65–99)
HDL CHOLESTEROL: 47 MG/DL
HEMATOCRIT: 40.7 % (ref 35–45)
HEMOGLOBIN: 13.9 G/DL (ref 11.7–15.5)
LDL-CHOLESTEROL: 110 MG/DL (CALC)
LYMPHOCYTES: 28.7 %
MCH: 32.4 PG (ref 27–33)
MCHC: 34.2 G/DL (ref 32–36)
MCV: 94.9 FL (ref 80–100)
MONOCYTES: 8.9 %
MPV: 11.9 FL (ref 7.5–12.5)
NEUTROPHILS: 56.6 %
NON-HDL CHOLESTEROL: 131 MG/DL (CALC)
PLATELET COUNT: 228 THOUSAND/UL (ref 140–400)
POTASSIUM: 4.5 MMOL/L (ref 3.5–5.3)
PROTEIN, TOTAL: 6.5 G/DL (ref 6.1–8.1)
RDW: 12 % (ref 11–15)
RED BLOOD CELL COUNT: 4.29 MILLION/UL (ref 3.8–5.1)
SODIUM: 138 MMOL/L (ref 135–146)
T4, FREE: 1 NG/DL (ref 0.8–1.8)
TRIGLYCERIDES: 107 MG/DL
TSH: 1.26 MIU/L
WHITE BLOOD CELL COUNT: 6.2 THOUSAND/UL (ref 3.8–10.8)

## 2023-12-06 ENCOUNTER — OFFICE VISIT (OUTPATIENT)
Dept: ENDOCRINOLOGY CLINIC | Facility: CLINIC | Age: 33
End: 2023-12-06
Payer: COMMERCIAL

## 2023-12-06 VITALS
WEIGHT: 197 LBS | DIASTOLIC BLOOD PRESSURE: 76 MMHG | HEART RATE: 80 BPM | SYSTOLIC BLOOD PRESSURE: 108 MMHG | BODY MASS INDEX: 29 KG/M2

## 2023-12-06 DIAGNOSIS — E10.65 UNCONTROLLED TYPE 1 DIABETES MELLITUS WITH HYPERGLYCEMIA (HCC): Primary | ICD-10-CM

## 2023-12-06 LAB
CARTRIDGE LOT#: ABNORMAL NUMERIC
GLUCOSE BLOOD: 111
HEMOGLOBIN A1C: 6.3 % (ref 4.3–5.6)
TEST STRIP LOT #: NORMAL NUMERIC

## 2023-12-06 PROCEDURE — 3074F SYST BP LT 130 MM HG: CPT | Performed by: INTERNAL MEDICINE

## 2023-12-06 PROCEDURE — 3078F DIAST BP <80 MM HG: CPT | Performed by: INTERNAL MEDICINE

## 2023-12-06 PROCEDURE — 82947 ASSAY GLUCOSE BLOOD QUANT: CPT | Performed by: INTERNAL MEDICINE

## 2023-12-06 PROCEDURE — 3044F HG A1C LEVEL LT 7.0%: CPT | Performed by: INTERNAL MEDICINE

## 2023-12-06 PROCEDURE — 99214 OFFICE O/P EST MOD 30 MIN: CPT | Performed by: INTERNAL MEDICINE

## 2023-12-06 PROCEDURE — 83036 HEMOGLOBIN GLYCOSYLATED A1C: CPT | Performed by: INTERNAL MEDICINE

## 2023-12-20 ENCOUNTER — PATIENT MESSAGE (OUTPATIENT)
Dept: ENDOCRINOLOGY CLINIC | Facility: CLINIC | Age: 33
End: 2023-12-20

## 2023-12-20 NOTE — TELEPHONE ENCOUNTER
Per LOV dtd 12/6/23: Continue Phentermine 30mg PO daily until end of January     RX pended for 30 day supply    Spoke to patient to advise RX pended for approval by provider (RN cannot approve controlled substance) - patient stated understanding

## 2023-12-21 RX ORDER — PHENTERMINE HYDROCHLORIDE 30 MG/1
30 CAPSULE ORAL EVERY MORNING
Qty: 30 CAPSULE | Refills: 0 | Status: SHIPPED | OUTPATIENT
Start: 2023-12-21

## 2023-12-21 NOTE — TELEPHONE ENCOUNTER
From: Oscar Gauthier  To: Alexey Sauer  Sent: 12/20/2023 1:04 PM CST  Subject: refill request    I apologize for the rush on this - I need to fill this today. I have been sick with the flu and didn't realize I was going to run out, but I am 100% out. .    Can you call this in asap? I checked and they haven't received anything yet, but the medication is in stock so once you request the refill, I can get it. Thanks!!   Harris Haywood

## 2024-04-04 ENCOUNTER — TELEPHONE (OUTPATIENT)
Dept: ENDOCRINOLOGY CLINIC | Facility: CLINIC | Age: 34
End: 2024-04-04

## 2024-04-04 NOTE — TELEPHONE ENCOUNTER
Bonifacio from [employer] is following up on CMN form faxed to the office please follow up gax # [phone]

## 2024-04-10 NOTE — TELEPHONE ENCOUNTER
CMN form from [employer] placed in Dr. Servin's folder for signature. Will need to fax back to: [phone]

## 2024-06-12 ENCOUNTER — OFFICE VISIT (OUTPATIENT)
Dept: ENDOCRINOLOGY CLINIC | Facility: CLINIC | Age: 34
End: 2024-06-12
Payer: COMMERCIAL

## 2024-06-12 VITALS
BODY MASS INDEX: 29 KG/M2 | SYSTOLIC BLOOD PRESSURE: 113 MMHG | HEART RATE: 75 BPM | DIASTOLIC BLOOD PRESSURE: 76 MMHG | WEIGHT: 199 LBS

## 2024-06-12 DIAGNOSIS — E10.65 UNCONTROLLED TYPE 1 DIABETES MELLITUS WITH HYPERGLYCEMIA (HCC): Primary | ICD-10-CM

## 2024-06-12 LAB
GLUCOSE BLOOD: 148
HEMOGLOBIN A1C: 6.4 % (ref 4.3–5.6)
TEST STRIP LOT #: NORMAL NUMERIC

## 2024-06-12 PROCEDURE — 82947 ASSAY GLUCOSE BLOOD QUANT: CPT | Performed by: INTERNAL MEDICINE

## 2024-06-12 PROCEDURE — 83036 HEMOGLOBIN GLYCOSYLATED A1C: CPT | Performed by: INTERNAL MEDICINE

## 2024-06-12 PROCEDURE — 99214 OFFICE O/P EST MOD 30 MIN: CPT | Performed by: INTERNAL MEDICINE

## 2024-06-12 NOTE — PROGRESS NOTES
Name: Maranda Gauthier  Date: 6/12/2024    Referring Physician: No ref. provider found    HISTORY OF PRESENT ILLNESS   Maranda Gauthier is a 33 year old female who presents for diabetes mellitus, diagnosed at age 12.     Since last visit she has bought a house and doing a lot of home renovations project.       Prior HbA, C or glycohemoglobin were 8.2% 8/2018; 6.8% 11/2018; 7.1% 2/2019; 6.8% 5/2019; 7.3% 8/2019; 6.8% 11/2019; 6.4% 8/2020; 6.5% 2/2021; 5.8% 8/2021; 6.2% 1/2022; 6.1% 5/2022; 6.5% 8/2022; 6.1% 12/2022; 6.4% 7/2023; 6.3% 12/2023; 6.4% POC Today     Dietary compliance: Good, CHO counting  Exercise: No, active   Polyuria/polydipsia: No  Blurred vision: No    Episodes of hypoglycemia: Yes, potentially due to overcorrecting  Blood Glucose:  Checking 3-4 times per day    Medications for DM   [employer]onic 780G pump     Pump Settings     Basal:  12A 1.05  3A 1.20  9A 1.05  12P 0.875  2P 0.925  6P 0.95      Bolus:  I:CR   12A 7.0  10:30A 7.5  4P 7.0    Sensitivity  12A 25  9P 35    Blood Glucose Target  12A   10P     Active Insulin Time 2:30 hours     Metformin d/c'd due to GI intolerance  Victoza stopped due to nausea      REVIEW OF SYSTEMS  Eyes: Diabetic retinopathy present: No            Most recent visit to eye doctor in last 12 months: Yes    CV: Cardiovascular disease present: No         Hypertension present: No         Hyperlipidemia present: No         Peripheral Vascular Disease present: No    : Nephropathy present: No    Neuro: Neuropathy present: Yes    Skin: Infection or ulceration: No    Osteoporosis: No    Thyroid disease: No      Medications:     Current Outpatient Medications:     Glucose Blood (ACCU-CHEK GUIDE) In Vitro Strip, TEST 4 TIMES DAILY AS DIRECTED, Disp: 400 strip, Rfl: 1    Insulin Lispro (HUMALOG) 100 UNIT/ML Injection Solution, Inject 110 Units/day into the skin daily., Disp: 90 mL, Rfl: 1    Prenatal 27-0.8 MG Oral Tab, Take 1 tablet by mouth daily., Disp: ,  Rfl:     Phentermine HCl 30 MG Oral Cap, Take 1 capsule (30 mg total) by mouth every morning. (Patient not taking: Reported on 6/12/2024), Disp: 30 capsule, Rfl: 0    HUMALOG KWIKPEN 100 UNIT/ML Subcutaneous Solution Pen-injector, Inject 7 units at breakfast, 8 units at lunch, and 9 units at dinner. (Patient not taking: No sig reported), Disp: 3 mL, Rfl: 0    Insulin Pen Needle (CAREFINE PEN NEEDLES) 32G X 5 MM Does not apply Misc, Use to inject insulin as directed, Disp: 100 each, Rfl: 0    insulin glargine (LANTUS SOLOSTAR) 100 UNIT/ML Subcutaneous Solution Pen-injector, Inject 40 Units into the skin nightly. (Patient not taking: No sig reported), Disp: 12 mL, Rfl: 0    Glucose Blood (CONTOUR NEXT TEST) In Vitro Strip, CHECK BLOOD SUGAR 8-10 TIMES DAILY, Disp: 600 strip, Rfl: 0     Allergies:   No Known Allergies    Social History:   Social History     Socioeconomic History    Marital status:    Tobacco Use    Smoking status: Never    Smokeless tobacco: Never   Vaping Use    Vaping status: Never Used   Substance and Sexual Activity    Alcohol use: Yes     Comment: rarely    Drug use: Yes     Types: Cannabis       Medical History:   Diabetes Mellitus Type 1    Surgical history:   No past surgical history on file.      PHYSICAL EXAM  /76   Pulse 75   Wt 199 lb (90.3 kg)   BMI 29.39 kg/m²     General Appearance:  alert, well developed, in no acute distress  Eyes:  normal conjunctivae, sclera., normal sclera and normal pupils  Ears/Nose/Mouth/Throat/Neck:  no palpable thyroid nodules or cervical lymphadenopathy  Back: no kyphosis or back tenderness  Skin:  normal moisture and skin texture  Hair & Nails:  normal scalp hair     Hematologic:  no excessive bruising  Neuro:  sensory grossly intact and motor grossly intact  Psychiatric:  oriented to time, self, and place  Nutritional:  no abnormal weight gain or loss    ASSESSMENT/PLAN:      1. Diabetes Mellitus Type 1, controlled  -controlled, HgA1c 6.4%  -->stable   -Congratulated patient on well controlled BG levels   -Discussed importance of glycemic control to prevent complications of diabetes  -Discussed complications of diabetes include retinopathy, neuropathy, nephropathy and cardiovascular disease  -Discussed importance of SBGM  -Discussed importance of CHO counting  -Continue current pump settings  -Continue Medtronic 780 pump   -She is trying again for pregnancy   -Normal lipids   -Normal thyroid levels   -UTD with optho  -Normal renal and liver function   -Normotensive   -Normal foot exam performed 12/2023     2. Obesity  -Phentermine on hold since 2/2024  -Weight is stable.     Of note, restarting fertility process and natural for now.      A total of 30 minutes was spent on obtaining history, reviewing pertinent labs, evaluating patient, providing multiple treatment options, reinforcing diet/exercise and compliance, and completing documentation.     RTC 6 months     6/12/2024  Aleta Servin MD

## 2024-06-20 ENCOUNTER — PATIENT MESSAGE (OUTPATIENT)
Dept: ENDOCRINOLOGY CLINIC | Facility: CLINIC | Age: 34
End: 2024-06-20

## 2024-06-20 ENCOUNTER — TELEPHONE (OUTPATIENT)
Dept: ENDOCRINOLOGY CLINIC | Facility: CLINIC | Age: 34
End: 2024-06-20

## 2024-06-20 DIAGNOSIS — E10.65 UNCONTROLLED TYPE 1 DIABETES MELLITUS WITH HYPERGLYCEMIA (HCC): Primary | ICD-10-CM

## 2024-06-20 RX ORDER — INSULIN GLARGINE-YFGN 100 [IU]/ML
40 INJECTION, SOLUTION SUBCUTANEOUS DAILY
Qty: 12 ML | Refills: 0 | Status: SHIPPED | OUTPATIENT
Start: 2024-06-20 | End: 2024-06-20

## 2024-06-20 RX ORDER — PEN NEEDLE, DIABETIC 32GX 5/32"
NEEDLE, DISPOSABLE MISCELLANEOUS
Qty: 100 EACH | Refills: 0 | Status: SHIPPED | OUTPATIENT
Start: 2024-06-20

## 2024-06-20 RX ORDER — PEN NEEDLE, DIABETIC 32 GX 1/4"
NEEDLE, DISPOSABLE MISCELLANEOUS 4 TIMES DAILY
Qty: 100 EACH | Refills: 0 | Status: SHIPPED | OUTPATIENT
Start: 2024-06-20

## 2024-06-20 RX ORDER — INSULIN GLARGINE 100 [IU]/ML
40 INJECTION, SOLUTION SUBCUTANEOUS NIGHTLY
Qty: 12 ML | Refills: 0 | Status: SHIPPED | OUTPATIENT
Start: 2024-06-20

## 2024-06-20 RX ORDER — INSULIN LISPRO 100 [IU]/ML
INJECTION, SOLUTION INTRAVENOUS; SUBCUTANEOUS
Qty: 12 ML | Refills: 0 | Status: SHIPPED | OUTPATIENT
Start: 2024-06-20 | End: 2024-06-20

## 2024-06-20 RX ORDER — INSULIN LISPRO 100 [IU]/ML
INJECTION, SOLUTION INTRAVENOUS; SUBCUTANEOUS
Qty: 12 ML | Refills: 0 | Status: SHIPPED | OUTPATIENT
Start: 2024-06-20

## 2024-06-20 RX ORDER — INSULIN GLARGINE 100 [IU]/ML
40 INJECTION, SOLUTION SUBCUTANEOUS NIGHTLY
Qty: 12 ML | Refills: 0 | Status: CANCELLED | OUTPATIENT
Start: 2024-06-20 | End: 2024-07-20

## 2024-06-20 NOTE — TELEPHONE ENCOUNTER
Amelia,     Please advise for Dr. Servin pt.     Pump has failed and pt is waiting for new one to arrive. Pt requesting insulin pen. Please advise. Thank you!

## 2024-06-20 NOTE — TELEPHONE ENCOUNTER
Called pt, LVM. MC sent with the below recommendations.   Rx resent to indicated pharmacy in [place].

## 2024-06-20 NOTE — TELEPHONE ENCOUNTER
Amelia -- I pended an order for Lantus but can you review the RX is correct? Rx to be sent to [facility] in [place], [place]. Semglee was sent because it said lantus isn't covered by pt's insurance but it is. Roya helped me with this too!

## 2024-06-20 NOTE — TELEPHONE ENCOUNTER
Patient called stating her medication was sent to the wrong pharmacy. Patient is in [place] and called to see if her prescription can be sent to the   [id number] - [place], [place] - [facility] [address] [place] [facility], [phone], [phone]   Patient is requesting to have a nurse call before the prescription is sent.  Please and thank you!

## 2024-06-20 NOTE — TELEPHONE ENCOUNTER
Patient calling states insulin pump is damaged and will be receiving a new one. States needs a  script for insulin pen. Please call.     [place] [id number]

## 2024-06-20 NOTE — TELEPHONE ENCOUNTER
Noted.     Please ask patient to transition to MDI    New regimen during the interim time while she is waiting to get new pump should be:    Lantus 40 units daily   Humalog per ICR    Breakfast: 1:7 + CF 1:25 >120    Lunch: 1: 7.5 + CF 1:25 >120   Dinner 1:7 + CF 1:25 > 120      New prescriptions have been sent to pharmacy.       Thank you!

## 2024-06-20 NOTE — TELEPHONE ENCOUNTER
From: Maranda Gauthier  To: Aleta Servin  Sent: 6/20/2024 2:10 PM CDT  Subject: Pump replacement need settings    Can you please send me the most recent pump settings you have on file from my appt last week? I will need to reprogram my settings when I get my replacement device tomorrow.     Thank you

## 2024-07-09 RX ORDER — INSULIN LISPRO 100 [IU]/ML
7 INJECTION, SOLUTION INTRAVENOUS; SUBCUTANEOUS DAILY
Qty: 12 ML | Refills: 0 | Status: SHIPPED | OUTPATIENT
Start: 2024-07-09

## 2024-07-22 NOTE — TELEPHONE ENCOUNTER
Endocrine refill protocol for basal insulins     Protocol Criteria:pass  - Appointment with Endocrinology completed in the last 6 months or scheduled in the next 3 months    - A1c result completed in the last 6 months and is below 8.5%     Verify appointment has been completed or scheduled in the appropriate timeline. If so can send a 90 day supply with 1 refill per provider protocol.    Verify A1c has been completed within the last 6 months and is below 8.5%     Last completed office visit:6/12/2024 Aleta Servin MD   Next scheduled Follow up: 12/18/24     Last A1c result: Last A1c value was 6.4% done 6/12/2024.    show

## 2024-08-15 ENCOUNTER — TELEPHONE (OUTPATIENT)
Dept: ENDOCRINOLOGY CLINIC | Facility: CLINIC | Age: 34
End: 2024-08-15

## 2024-11-11 RX ORDER — INSULIN LISPRO 100 [IU]/ML
INJECTION, SOLUTION INTRAVENOUS; SUBCUTANEOUS
Qty: 90 ML | Refills: 0 | Status: SHIPPED | OUTPATIENT
Start: 2024-11-11

## 2024-11-11 NOTE — TELEPHONE ENCOUNTER
Endocrine refill protocol for basal insulins     Protocol Criteria: PASSED Reason: N/A    If all below requirements are met, send a 90-day supply with 1 refill per provider protocol.       Verify Appointment with Endocrinology completed in the last 6 months or scheduled in the next 3 months.  Verify A1C has been completed within the last 6 months and is below 8.5%     Last completed office visit:6/12/2024 Aleta Servin MD   Next scheduled Follow up:   Future Appointments   Date Time Provider Department Center   12/18/2024 11:00 AM Aleta Servin MD [facility]      Last A1c result: Last A1c value was 6.4% done 6/12/2024.

## 2024-12-18 ENCOUNTER — OFFICE VISIT (OUTPATIENT)
Dept: ENDOCRINOLOGY CLINIC | Facility: CLINIC | Age: 34
End: 2024-12-18

## 2024-12-18 VITALS
BODY MASS INDEX: 34 KG/M2 | DIASTOLIC BLOOD PRESSURE: 77 MMHG | SYSTOLIC BLOOD PRESSURE: 117 MMHG | WEIGHT: 228 LBS | HEART RATE: 78 BPM

## 2024-12-18 DIAGNOSIS — E04.9 THYROID ENLARGEMENT: ICD-10-CM

## 2024-12-18 DIAGNOSIS — E10.65 UNCONTROLLED TYPE 1 DIABETES MELLITUS WITH HYPERGLYCEMIA (HCC): Primary | ICD-10-CM

## 2024-12-18 LAB
ABSOLUTE BASOPHILS: 48 CELLS/UL (ref 0–200)
ABSOLUTE EOSINOPHILS: 299 CELLS/UL (ref 15–500)
ABSOLUTE LYMPHOCYTES: 1870 CELLS/UL (ref 850–3900)
ABSOLUTE MONOCYTES: 585 CELLS/UL (ref 200–950)
ABSOLUTE NEUTROPHILS: 3998 CELLS/UL (ref 1500–7800)
ALBUMIN/GLOBULIN RATIO: 1.6 (CALC) (ref 1–2.5)
ALBUMIN: 4.4 G/DL (ref 3.6–5.1)
ALKALINE PHOSPHATASE: 60 U/L (ref 31–125)
ALT: 11 U/L (ref 6–29)
AST: 14 U/L (ref 10–30)
BASOPHILS: 0.7 %
BILIRUBIN, TOTAL: 0.5 MG/DL (ref 0.2–1.2)
BUN: 17 MG/DL (ref 7–25)
CALCIUM: 9.3 MG/DL (ref 8.6–10.2)
CARBON DIOXIDE: 25 MMOL/L (ref 20–32)
CHLORIDE: 106 MMOL/L (ref 98–110)
CHOL/HDLC RATIO: 2.7 (CALC)
CHOLESTEROL, TOTAL: 201 MG/DL
CREATININE: 0.7 MG/DL (ref 0.5–0.97)
EGFR: 118 ML/MIN/1.73M2
EOSINOPHILS: 4.4 %
GLOBULIN: 2.8 G/DL (CALC) (ref 1.9–3.7)
GLUCOSE BLOOD: 149
GLUCOSE: 106 MG/DL (ref 65–99)
HDL CHOLESTEROL: 74 MG/DL
HEMATOCRIT: 42.2 % (ref 35–45)
HEMOGLOBIN A1C: 6.3 % (ref 4.3–5.6)
HEMOGLOBIN: 14.7 G/DL (ref 11.7–15.5)
LDL-CHOLESTEROL: 113 MG/DL (CALC)
LYMPHOCYTES: 27.5 %
MCH: 33 PG (ref 27–33)
MCHC: 34.8 G/DL (ref 32–36)
MCV: 94.6 FL (ref 80–100)
MONOCYTES: 8.6 %
MPV: 11.3 FL (ref 7.5–12.5)
NEUTROPHILS: 58.8 %
NON-HDL CHOLESTEROL: 127 MG/DL (CALC)
PLATELET COUNT: 262 THOUSAND/UL (ref 140–400)
POTASSIUM: 4.3 MMOL/L (ref 3.5–5.3)
PROTEIN, TOTAL: 7.2 G/DL (ref 6.1–8.1)
RDW: 11.7 % (ref 11–15)
RED BLOOD CELL COUNT: 4.46 MILLION/UL (ref 3.8–5.1)
SODIUM: 138 MMOL/L (ref 135–146)
TEST STRIP LOT #: NORMAL NUMERIC
TRIGLYCERIDES: 59 MG/DL
TSH: 1.13 MIU/L
WHITE BLOOD CELL COUNT: 6.8 THOUSAND/UL (ref 3.8–10.8)

## 2024-12-18 PROCEDURE — 95251 CONT GLUC MNTR ANALYSIS I&R: CPT | Performed by: INTERNAL MEDICINE

## 2024-12-18 PROCEDURE — 99214 OFFICE O/P EST MOD 30 MIN: CPT | Performed by: INTERNAL MEDICINE

## 2024-12-18 PROCEDURE — 83036 HEMOGLOBIN GLYCOSYLATED A1C: CPT | Performed by: INTERNAL MEDICINE

## 2024-12-18 PROCEDURE — 82947 ASSAY GLUCOSE BLOOD QUANT: CPT | Performed by: INTERNAL MEDICINE

## 2024-12-18 NOTE — PROGRESS NOTES
Name: Maranda Gauthier  Date: 12/18/2024      HISTORY OF PRESENT ILLNESS   Maranda Gauthier is a 34 year old female who presents for diabetes mellitus, diagnosed at age 12.     Since last visit she has bought a house and doing a lot of home renovations project.       Prior HbA, C or glycohemoglobin were 8.2% 8/2018; 6.8% 11/2018; 7.1% 2/2019; 6.8% 5/2019; 7.3% 8/2019; 6.8% 11/2019; 6.4% 8/2020; 6.5% 2/2021; 5.8% 8/2021; 6.2% 1/2022; 6.1% 5/2022; 6.5% 8/2022; 6.1% 12/2022; 6.4% 7/2023; 6.3% 12/2023; 6.4% 6/2024; 6.3% POC Today     Dietary compliance: Good, CHO counting  Exercise: No, active   Polyuria/polydipsia: No  Blurred vision: No    Episodes of hypoglycemia: Yes, potentially due to overcorrecting  Blood Glucose:  Checking 3-4 times per day    Medications for DM   Medtronic 780G pump     Pump Settings     Basal:  12A 1.05  3A 1.20  9A 1.05  12P 0.875  2P 0.925  6P 0.95      Bolus:  I:CR   12A 7.0  10:30A 7.5  4P 7.0    Sensitivity  12A 25  9P 35    Blood Glucose Target  12A   10P     Active Insulin Time 2:30 hours     Metformin d/c'd due to GI intolerance  Victoza stopped due to nausea      REVIEW OF SYSTEMS  Eyes: Diabetic retinopathy present: No            Most recent visit to eye doctor in last 12 months: Yes    CV: Cardiovascular disease present: No         Hypertension present: No         Hyperlipidemia present: No         Peripheral Vascular Disease present: No    : Nephropathy present: No    Neuro: Neuropathy present: Yes    Skin: Infection or ulceration: No    Osteoporosis: No    Thyroid disease: No      Medications:     Current Outpatient Medications:     HUMALOG 100 UNIT/ML Injection Solution, INJECT 110 UNITS/ DAY INTO THE SKIN DAILY AS DIRECTED, Disp: 90 mL, Rfl: 0    Insulin Pen Needle (NOVOFINE PEN NEEDLE) 32G X 6 MM Does not apply Misc, in the morning, at noon, in the evening, and at bedtime. 1 each in the morning, at noon, in the evening, and at bedtime., Disp: 100 each,  Rfl: 0    insulin glargine (LANTUS SOLOSTAR) 100 UNIT/ML Subcutaneous Solution Pen-injector, Inject 40 Units into the skin nightly., Disp: 12 mL, Rfl: 0    Insulin Pen Needle (BD PEN NEEDLE JOSEPH U/F) 32G X 4 MM Does not apply Misc, Use as directed to inject insulin, Disp: 100 each, Rfl: 0    Glucose Blood (ACCU-CHEK GUIDE) In Vitro Strip, TEST 4 TIMES DAILY AS DIRECTED, Disp: 400 strip, Rfl: 1    Prenatal 27-0.8 MG Oral Tab, Take 1 tablet by mouth daily., Disp: , Rfl:     Insulin Pen Needle (CAREFINE PEN NEEDLES) 32G X 5 MM Does not apply Misc, Use to inject insulin as directed, Disp: 100 each, Rfl: 0    Glucose Blood (CONTOUR NEXT TEST) In Vitro Strip, CHECK BLOOD SUGAR 8-10 TIMES DAILY, Disp: 600 strip, Rfl: 0     Allergies:   No Known Allergies    Social History:   Social History     Socioeconomic History    Marital status:    Tobacco Use    Smoking status: Never    Smokeless tobacco: Never   Vaping Use    Vaping status: Never Used   Substance and Sexual Activity    Alcohol use: Yes     Comment: rarely    Drug use: Yes     Types: Cannabis       Medical History:   Diabetes Mellitus Type 1    Surgical history:   No past surgical history on file.      PHYSICAL EXAM  /77   Pulse 78   Wt 228 lb (103.4 kg)   BMI 33.67 kg/m²     General Appearance:  alert, well developed, in no acute distress  Eyes:  normal conjunctivae, sclera., normal sclera and normal pupils  Ears/Nose/Mouth/Throat/Neck:  no palpable thyroid nodules or cervical lymphadenopathy  Back: no kyphosis or back tenderness  Skin:  normal moisture and skin texture  Hair & Nails:  normal scalp hair     Hematologic:  no excessive bruising  Neuro:  sensory grossly intact and motor grossly intact  Psychiatric:  oriented to time, self, and place  Nutritional:  no abnormal weight gain or loss  Bilateral barefoot skin diabetic exam is normal, visualized feet and the appearance is normal.  Bilateral monofilament/sensation of both feet is  normal.  Pulsation pedal pulse exam of both lower legs/feet is normal as well.        ASSESSMENT/PLAN:      1. Diabetes Mellitus Type 1, controlled  -controlled, HgA1c 6.3% -->stable   -Congratulated patient on well controlled BG levels   -Discussed importance of glycemic control to prevent complications of diabetes  -Discussed complications of diabetes include retinopathy, neuropathy, nephropathy and cardiovascular disease  -Discussed importance of SBGM  -Discussed importance of CHO counting  -Continue current pump settings  -Continue Medtronic 780 pump   -She is trying again for pregnancy   -Normal lipids   -Normal thyroid levels   -UTD with optho  -Normal renal and liver function   -Normotensive   -Normal foot exam performed today   -Check Thyroid US given paternal h/o thyroid cancer     2. Obesity  -Phentermine on hold since 2/2024  -Weight is stable.     A total of 30 minutes was spent on obtaining history, reviewing pertinent labs, evaluating patient, providing multiple treatment options, reinforcing diet/exercise and compliance, and completing documentation.     RTC 6 months     12/18/2024  Aleta Servin MD

## 2024-12-26 ENCOUNTER — PATIENT MESSAGE (OUTPATIENT)
Dept: ENDOCRINOLOGY CLINIC | Facility: CLINIC | Age: 34
End: 2024-12-26

## 2024-12-26 NOTE — TELEPHONE ENCOUNTER
Dr. Servin -- medtronic report was sent to you by MA     1/22 and 1/29 have no openings at 12pm.     1/15 - both 12 and 12:15 pm are taken

## 2024-12-26 NOTE — TELEPHONE ENCOUNTER
Please send me Medtronic download for review.  Also she needs to be scheduled for 4 week visit.  Ok to offer 12pm in [facility]. Thanks.

## 2024-12-27 NOTE — TELEPHONE ENCOUNTER
Consulted with provider on call Dr. Britton. Patient can turn smartguard off for 1-2 days and see if glucose levels are better since glucose targets are different during pregnancy. If improved patient to keep smarguard off.  Called patient. She said she had smartguard off with previous pregnancies and is familiar on how to use pump on manual mode. Patient will turn smarguard off and will keep it off if her glucose levels have improved in the next 1-2 days.   Patient has follow up with Dr. Servin on 2/5/25.

## 2025-01-07 ENCOUNTER — PATIENT MESSAGE (OUTPATIENT)
Dept: ENDOCRINOLOGY CLINIC | Facility: CLINIC | Age: 35
End: 2025-01-07

## 2025-01-10 ENCOUNTER — TELEPHONE (OUTPATIENT)
Dept: ENDOCRINOLOGY CLINIC | Facility: CLINIC | Age: 35
End: 2025-01-10

## 2025-01-10 NOTE — TELEPHONE ENCOUNTER
Called patient and provided the instructions below. Patient verbalizes understanding. RN advised to provide an update on blood sugar with this adjustments in a few days. Patient understands to call earlier if blood sugar <80 or persistently >200.

## 2025-01-10 NOTE — TELEPHONE ENCOUNTER
Please continue the Smartguard, overall I think BG levels are better with automated system.  The target on the computer software can not be changed.  So we might need to adjust her bolus amount.      Change I:CR   10:30AM 7.5-->7.0  4P 6.5 -->6.0

## 2025-01-10 NOTE — TELEPHONE ENCOUNTER
Patient is currently pregnant with type 1 diabetes and on medtronic insulin pump. Most recently patient was advised to turn smart guard off by on call provider on 12/27/24 due to pregnancy for a few days to see if it would improve BG values.     Hypoglycemia    Onset of hypoglycemia: Patient nurse smart guard off last Monday. While she was on smart guard, BG target was sent to 100 and her sugars were always around 130. Patient turned it off in attempt to improve average blood glucose levels. However, upon disabling it, she starting having elevated BG values after meals which required additional boluses. Subsequently causing hypoglycemia.     BG levels: patient is wearing medtronic pump with carelink CGM. Will sent report for past 1 week.      Pattern of hypoglycemia: Low blood sugars tend to happen after meals. She has had a few lows over night as well.     Symptoms: Yes patient is symptomatic when low.      Acute illness: little nausea attributed to pregnancy. No vomiting. No illness.     Hypoglycemia Mx/Rule of 15: yes she is aware of rule of 15.     Change in Diet: Smaller meals. But eat trying to eat comprehensive meals. Meal times are typically 8, 11, 1, 5pm.     List Medications/Compliance: Humalog via medtronic pump.       For patient on pump therapy:    When are they giving their bolus each meal before or after? -- patient tries to give her initial meal time bolus 10 to 15 minutes before her meal. Then if her BG is still high right before eating, sometimes she is unsure if she should eat yet or wait longer.     Over-correction (when was the last insulin administration and how much)? -- Patient states if BG is still elevated 1 hour after the initial bolus and meal, she will often give another bolus, this may lead to lows. Today last bolus was before 10 am with granola bar. Currently sugar is 132.     Patient states she was getting frustrated with the oscillating BG levels since smart guard was turned off so  she turned it back on last night at 8pm. Current safegaurd target is 100, but her blood sugar is always around 130. Patient asking if she should keep smart guard on, and if so, if the target can be adjusted lower so her average sugar is closer to 100 instead of 130.

## 2025-01-10 NOTE — TELEPHONE ENCOUNTER
Patient calling states regards low blood sugar levels and adjustments needed for insulin. Please call or send IDbyME message.

## 2025-02-03 ENCOUNTER — TELEPHONE (OUTPATIENT)
Dept: ENDOCRINOLOGY CLINIC | Facility: CLINIC | Age: 35
End: 2025-02-03

## 2025-02-03 NOTE — TELEPHONE ENCOUNTER
Current Outpatient Medications   Medication Sig Dispense Refill                                Glucose Blood (ACCU-CHEK Guide test strips 100 s TEST 4 TIMES DAILY AS DIRECTED 400 strip 1

## 2025-02-04 RX ORDER — BLOOD SUGAR DIAGNOSTIC
STRIP MISCELLANEOUS
Qty: 400 EACH | Refills: 0 | Status: SHIPPED | OUTPATIENT
Start: 2025-02-04

## 2025-02-04 NOTE — TELEPHONE ENCOUNTER
Endocrine Refill protocol for Glucose testing supplies     Protocol Criteria: PASSED Reason: N/A    If below requirement is met, send a 90-day supply with 1 refill per provider protocol.    Verify appointment with Endocrinology completed in the last 6 months or scheduled in the next 3 months.    Last completed office visit: 12/18/2024 Aleta Servin MD   Next scheduled Follow up:   Future Appointments   Date Time Provider Department Center   2/5/2025 12:00 PM Aleta Servin MD [facility]   6/18/2025 11:00 AM Aleta Servin MD [facility]

## 2025-02-05 ENCOUNTER — OFFICE VISIT (OUTPATIENT)
Dept: ENDOCRINOLOGY CLINIC | Facility: CLINIC | Age: 35
End: 2025-02-05
Payer: COMMERCIAL

## 2025-02-05 VITALS
DIASTOLIC BLOOD PRESSURE: 85 MMHG | WEIGHT: 237 LBS | HEART RATE: 77 BPM | SYSTOLIC BLOOD PRESSURE: 139 MMHG | BODY MASS INDEX: 35 KG/M2

## 2025-02-05 DIAGNOSIS — E10.65 UNCONTROLLED TYPE 1 DIABETES MELLITUS WITH HYPERGLYCEMIA (HCC): Primary | ICD-10-CM

## 2025-02-05 LAB
GLUCOSE BLOOD: 94
HEMOGLOBIN A1C: 5.9 % (ref 4.3–5.6)
TEST STRIP LOT #: NORMAL NUMERIC

## 2025-02-05 PROCEDURE — 83036 HEMOGLOBIN GLYCOSYLATED A1C: CPT | Performed by: INTERNAL MEDICINE

## 2025-02-05 PROCEDURE — 82947 ASSAY GLUCOSE BLOOD QUANT: CPT | Performed by: INTERNAL MEDICINE

## 2025-02-05 PROCEDURE — 99214 OFFICE O/P EST MOD 30 MIN: CPT | Performed by: INTERNAL MEDICINE

## 2025-02-05 NOTE — PROGRESS NOTES
Name: Maranda Gauthier  Date: 2/5/2025    HISTORY OF PRESENT ILLNESS   Maranda Gauthier is a 34 year old female who presents for diabetes mellitus, diagnosed at age 12.     She is pregnant, currently 10 weeks gestation.      Prior HbA, C or glycohemoglobin were 8.2% 8/2018; 6.8% 11/2018; 7.1% 2/2019; 6.8% 5/2019; 7.3% 8/2019; 6.8% 11/2019; 6.4% 8/2020; 6.5% 2/2021; 5.8% 8/2021; 6.2% 1/2022; 6.1% 5/2022; 6.5% 8/2022; 6.1% 12/2022; 6.4% 7/2023; 6.3% 12/2023; 6.4% 6/2024; 6.3% 12/2024; 5.9% POC Today     Dietary compliance: Good, CHO counting  Exercise: No, active   Polyuria/polydipsia: No  Blurred vision: No    Episodes of hypoglycemia: Yes, potentially due to overcorrecting  Blood Glucose:  Checking 3-4 times per day    Medications for DM   Medtronic 780G pump     Pump Settings     Basal:  12A 1.20  3A 1.30 -->1.40  9A 1.10 -->1.20  12P 0.925  2P 1.1  6P 0.975      Bolus:  I:CR   12A 6.5  10:30A 6.0  4P 5.5 -->5.0    Sensitivity  12A 25  9P 35    Blood Glucose Target  12A   10P     Active Insulin Time 2:30 hours     Metformin d/c'd due to GI intolerance  Victoza stopped due to nausea      REVIEW OF SYSTEMS  Eyes: Diabetic retinopathy present: No            Most recent visit to eye doctor in last 12 months: Yes    CV: Cardiovascular disease present: No         Hypertension present: No         Hyperlipidemia present: No         Peripheral Vascular Disease present: No    : Nephropathy present: No    Neuro: Neuropathy present: Yes    Skin: Infection or ulceration: No    Osteoporosis: No    Thyroid disease: No      Medications:     Current Outpatient Medications:     Glucose Blood (ACCU-CHEK GUIDE TEST) In Vitro Strip, Check blood sugar 4 times daily Dx:E10.65, Disp: 400 each, Rfl: 0    HUMALOG 100 UNIT/ML Injection Solution, INJECT 110 UNITS/ DAY INTO THE SKIN DAILY AS DIRECTED, Disp: 90 mL, Rfl: 0    Insulin Pen Needle (NOVOFINE PEN NEEDLE) 32G X 6 MM Does not apply Misc, in the morning, at noon,  in the evening, and at bedtime. 1 each in the morning, at noon, in the evening, and at bedtime., Disp: 100 each, Rfl: 0    insulin glargine (LANTUS SOLOSTAR) 100 UNIT/ML Subcutaneous Solution Pen-injector, Inject 40 Units into the skin nightly., Disp: 12 mL, Rfl: 0    Insulin Pen Needle (BD PEN NEEDLE JOSEPH U/F) 32G X 4 MM Does not apply Misc, Use as directed to inject insulin, Disp: 100 each, Rfl: 0    Glucose Blood (ACCU-CHEK GUIDE) In Vitro Strip, TEST 4 TIMES DAILY AS DIRECTED, Disp: 400 strip, Rfl: 1    Prenatal 27-0.8 MG Oral Tab, Take 1 tablet by mouth daily., Disp: , Rfl:     Insulin Pen Needle (CAREFINE PEN NEEDLES) 32G X 5 MM Does not apply Misc, Use to inject insulin as directed, Disp: 100 each, Rfl: 0    Glucose Blood (CONTOUR NEXT TEST) In Vitro Strip, CHECK BLOOD SUGAR 8-10 TIMES DAILY, Disp: 600 strip, Rfl: 0     Allergies:   No Known Allergies    Social History:   Social History     Socioeconomic History    Marital status:    Tobacco Use    Smoking status: Never    Smokeless tobacco: Never   Vaping Use    Vaping status: Never Used   Substance and Sexual Activity    Alcohol use: Yes     Comment: rarely    Drug use: Yes     Types: Cannabis       Medical History:   Diabetes Mellitus Type 1    Surgical history:   No past surgical history on file.      PHYSICAL EXAM  /85   Pulse 77   Wt 237 lb (107.5 kg)   BMI 35.00 kg/m²     General Appearance:  alert, well developed, in no acute distress  Eyes:  normal conjunctivae, sclera., normal sclera and normal pupils  Ears/Nose/Mouth/Throat/Neck:  no palpable thyroid nodules or cervical lymphadenopathy  Back: no kyphosis or back tenderness  Skin:  normal moisture and skin texture  Hair & Nails:  normal scalp hair     Hematologic:  no excessive bruising  Neuro:  sensory grossly intact and motor grossly intact  Psychiatric:  oriented to time, self, and place  Nutritional:  no abnormal weight gain or loss      ASSESSMENT/PLAN:      1. Diabetes Mellitus  Type 1, controlled  -controlled, HgA1c 5.9% -->improved   -Congratulated patient on well controlled BG levels   -Now pregnant 10 weeks   -Discussed importance of glycemic control to prevent complications of diabetes  -Discussed complications of diabetes include retinopathy, neuropathy, nephropathy and cardiovascular disease  -Discussed importance of SBGM  -Discussed importance of CHO counting  -Continue Medtronic 780 pump   -Adjusted insulin pump as noted above   -Normal lipids   -Normal thyroid levels   -UTD with optho  -Normal renal and liver function   -Normotensive   -Normal foot exam performed 12/2024   -Check Thyroid US given paternal h/o thyroid cancer -->on hold given pregnancy     A total of 30 minutes was spent on obtaining history, reviewing pertinent labs, evaluating patient, providing multiple treatment options, reinforcing diet/exercise and compliance, and completing documentation.     RTC 6 weeks     2/5/2025  Aleta Servin MD

## 2025-02-28 RX ORDER — BLOOD SUGAR DIAGNOSTIC
STRIP MISCELLANEOUS
Qty: 400 EACH | Refills: 0 | Status: SHIPPED | OUTPATIENT
Start: 2025-02-28

## 2025-02-28 RX ORDER — INSULIN LISPRO 100 [IU]/ML
INJECTION, SOLUTION INTRAVENOUS; SUBCUTANEOUS
Qty: 90 ML | Refills: 0 | Status: SHIPPED | OUTPATIENT
Start: 2025-02-28

## 2025-02-28 NOTE — TELEPHONE ENCOUNTER
Endocrine refill protocol for basal insulins     Protocol Criteria: PASSED Reason: N/A    If all below requirements are met, send a 90-day supply with 1 refill per provider protocol.       Verify Appointment with Endocrinology completed in the last 6 months or scheduled in the next 3 months.  Verify A1C has been completed within the last 6 months and is below 8.5%     Last completed office visit:2/5/2025 Aleta Servin MD   Next scheduled Follow up:   Future Appointments   Date Time Provider Department Center   3/19/2025 12:00 PM Aleta Servin MD [facility]   6/18/2025 11:00 AM Aleta Servin MD [facility]      Last A1c result: Last A1c value was 5.9% done 2/5/2025.

## 2025-03-14 ENCOUNTER — PATIENT MESSAGE (OUTPATIENT)
Dept: ENDOCRINOLOGY CLINIC | Facility: CLINIC | Age: 35
End: 2025-03-14

## 2025-03-19 ENCOUNTER — OFFICE VISIT (OUTPATIENT)
Dept: ENDOCRINOLOGY CLINIC | Facility: CLINIC | Age: 35
End: 2025-03-19
Payer: COMMERCIAL

## 2025-03-19 VITALS
HEART RATE: 93 BPM | WEIGHT: 238 LBS | BODY MASS INDEX: 35 KG/M2 | DIASTOLIC BLOOD PRESSURE: 81 MMHG | SYSTOLIC BLOOD PRESSURE: 136 MMHG

## 2025-03-19 DIAGNOSIS — E10.65 UNCONTROLLED TYPE 1 DIABETES MELLITUS WITH HYPERGLYCEMIA (HCC): Primary | ICD-10-CM

## 2025-03-19 LAB
GLUCOSE BLOOD: 106
HEMOGLOBIN A1C: 5.8 % (ref 4.3–5.6)
TEST STRIP LOT #: NORMAL NUMERIC

## 2025-03-19 PROCEDURE — 83036 HEMOGLOBIN GLYCOSYLATED A1C: CPT | Performed by: INTERNAL MEDICINE

## 2025-03-19 PROCEDURE — 82947 ASSAY GLUCOSE BLOOD QUANT: CPT | Performed by: INTERNAL MEDICINE

## 2025-03-19 PROCEDURE — 99214 OFFICE O/P EST MOD 30 MIN: CPT | Performed by: INTERNAL MEDICINE

## 2025-03-19 NOTE — PROGRESS NOTES
Name: Maranda Gauthier  Date: 3/19/2025    HISTORY OF PRESENT ILLNESS   Maranda Gauthier is a 34 year old female who presents for diabetes mellitus, diagnosed at age 12.     She is pregnant, currently 16 weeks gestation.      Prior HbA, C or glycohemoglobin were 8.2% 8/2018; 6.8% 11/2018; 7.1% 2/2019; 6.8% 5/2019; 7.3% 8/2019; 6.8% 11/2019; 6.4% 8/2020; 6.5% 2/2021; 5.8% 8/2021; 6.2% 1/2022; 6.1% 5/2022; 6.5% 8/2022; 6.1% 12/2022; 6.4% 7/2023; 6.3% 12/2023; 6.4% 6/2024; 6.3% 12/2024; 5.9% 2/2025; 5.8% POC Today     Dietary compliance: Good, CHO counting  Exercise: No, active   Polyuria/polydipsia: No  Blurred vision: No    Episodes of hypoglycemia: Yes, potentially due to overcorrecting  Blood Glucose:  Checking 3-4 times per day    Medications for DM   [employer] 780G pump     Pump Settings     Basal:  12A 1.30  3A 1.60  9A 1.70  12P 1.35-->1.45  2P 1.4  6P 1.35      Bolus:  I:CR   12A 6.5  10:30A 5.5-->5.0  4P 4.5    Sensitivity  12A 20  9P 30    Blood Glucose Target  12A   10P     Active Insulin Time 2:30 hours     Metformin d/c'd due to GI intolerance  Victoza stopped due to nausea      REVIEW OF SYSTEMS  Eyes: Diabetic retinopathy present: No            Most recent visit to eye doctor in last 12 months: Yes    CV: Cardiovascular disease present: No         Hypertension present: No         Hyperlipidemia present: No         Peripheral Vascular Disease present: No    : Nephropathy present: No    Neuro: Neuropathy present: Yes    Skin: Infection or ulceration: No    Osteoporosis: No    Thyroid disease: No      Medications:     Current Outpatient Medications:     HUMALOG 100 UNIT/ML Injection Solution, Inject 110 units subcutaneous daily via insulin pump, Disp: 90 mL, Rfl: 0    Glucose Blood (ACCU-CHEK GUIDE TEST) In Vitro Strip, Check blood sugar 4 times daily Dx:E10.65, Disp: 400 each, Rfl: 0    Insulin Pen Needle (NOVOFINE PEN NEEDLE) 32G X 6 MM Does not apply Misc, in the morning, at noon,  in the evening, and at bedtime. 1 each in the morning, at noon, in the evening, and at bedtime., Disp: 100 each, Rfl: 0    insulin glargine (LANTUS SOLOSTAR) 100 UNIT/ML Subcutaneous Solution Pen-injector, Inject 40 Units into the skin nightly., Disp: 12 mL, Rfl: 0    Insulin Pen Needle (BD PEN NEEDLE JOSEPH U/F) 32G X 4 MM Does not apply Misc, Use as directed to inject insulin, Disp: 100 each, Rfl: 0    Glucose Blood (ACCU-CHEK GUIDE) In Vitro Strip, TEST 4 TIMES DAILY AS DIRECTED, Disp: 400 strip, Rfl: 1    Prenatal 27-0.8 MG Oral Tab, Take 1 tablet by mouth daily., Disp: , Rfl:     Insulin Pen Needle (CAREFINE PEN NEEDLES) 32G X 5 MM Does not apply Misc, Use to inject insulin as directed, Disp: 100 each, Rfl: 0    Glucose Blood (CONTOUR NEXT TEST) In Vitro Strip, CHECK BLOOD SUGAR 8-10 TIMES DAILY, Disp: 600 strip, Rfl: 0     Allergies:   No Known Allergies    Social History:   Social History     Socioeconomic History    Marital status:    Tobacco Use    Smoking status: Never    Smokeless tobacco: Never   Vaping Use    Vaping status: Never Used   Substance and Sexual Activity    Alcohol use: Yes     Comment: rarely    Drug use: Yes     Types: Cannabis       Medical History:   Diabetes Mellitus Type 1    Surgical history:   No past surgical history on file.      PHYSICAL EXAM  /81   Pulse 93   Wt 238 lb (108 kg)   BMI 35.15 kg/m²     General Appearance:  alert, well developed, in no acute distress  Eyes:  normal conjunctivae, sclera., normal sclera and normal pupils  Ears/Nose/Mouth/Throat/Neck:  no palpable thyroid nodules or cervical lymphadenopathy  Back: no kyphosis or back tenderness  Skin:  normal moisture and skin texture  Hair & Nails:  normal scalp hair     Hematologic:  no excessive bruising  Neuro:  sensory grossly intact and motor grossly intact  Psychiatric:  oriented to time, self, and place  Nutritional:  no abnormal weight gain or loss      ASSESSMENT/PLAN:      1. Diabetes Mellitus  Type 1, controlled  -controlled, HgA1c 5.8% -->stable   -Congratulated patient on well controlled BG levels   -Now pregnant 16 weeks   -Discussed importance of glycemic control to prevent complications of diabetes  -Discussed complications of diabetes include retinopathy, neuropathy, nephropathy and cardiovascular disease  -Discussed importance of SBGM  -Discussed importance of CHO counting  -Continue Medtronic 780 pump   -Adjusted insulin pump as noted above   -Normal lipids   -Normal thyroid levels   -UTD with optho  -Normal renal and liver function   -Normotensive   -Normal foot exam performed 12/2024   -Check Thyroid US given paternal h/o thyroid cancer -->on hold given pregnancy     A total of 30 minutes was spent on obtaining history, reviewing pertinent labs, evaluating patient, providing multiple treatment options, reinforcing diet/exercise and compliance, and completing documentation.     RTC 6 weeks     3/19/2025  Aleta Servin MD

## 2025-04-21 ENCOUNTER — PATIENT MESSAGE (OUTPATIENT)
Dept: ENDOCRINOLOGY CLINIC | Facility: CLINIC | Age: 35
End: 2025-04-21

## 2025-04-30 ENCOUNTER — OFFICE VISIT (OUTPATIENT)
Dept: ENDOCRINOLOGY CLINIC | Facility: CLINIC | Age: 35
End: 2025-04-30
Payer: COMMERCIAL

## 2025-04-30 VITALS
DIASTOLIC BLOOD PRESSURE: 78 MMHG | WEIGHT: 262 LBS | BODY MASS INDEX: 39 KG/M2 | HEART RATE: 90 BPM | SYSTOLIC BLOOD PRESSURE: 120 MMHG

## 2025-04-30 DIAGNOSIS — E10.65 UNCONTROLLED TYPE 1 DIABETES MELLITUS WITH HYPERGLYCEMIA (HCC): Primary | ICD-10-CM

## 2025-04-30 LAB
GLUCOSE BLOOD: 167
HEMOGLOBIN A1C: 5.6 % (ref 4.3–5.6)
TEST STRIP LOT #: NORMAL NUMERIC

## 2025-04-30 PROCEDURE — 95251 CONT GLUC MNTR ANALYSIS I&R: CPT | Performed by: INTERNAL MEDICINE

## 2025-04-30 PROCEDURE — 99214 OFFICE O/P EST MOD 30 MIN: CPT | Performed by: INTERNAL MEDICINE

## 2025-04-30 PROCEDURE — 83036 HEMOGLOBIN GLYCOSYLATED A1C: CPT | Performed by: INTERNAL MEDICINE

## 2025-04-30 PROCEDURE — 82947 ASSAY GLUCOSE BLOOD QUANT: CPT | Performed by: INTERNAL MEDICINE

## 2025-04-30 NOTE — PROGRESS NOTES
Name: Maranda Gauthier  Date: 4/30/2025    HISTORY OF PRESENT ILLNESS   Maranda Gauthier is a 34 year old female who presents for diabetes mellitus, diagnosed at age 12.     She is pregnant, currently 23 weeks gestation.  Baby boy, scheduled for cardiac US next week.     Prior HbA, C or glycohemoglobin were 8.2% 8/2018; 6.8% 11/2018; 7.1% 2/2019; 6.8% 5/2019; 7.3% 8/2019; 6.8% 11/2019; 6.4% 8/2020; 6.5% 2/2021; 5.8% 8/2021; 6.2% 1/2022; 6.1% 5/2022; 6.5% 8/2022; 6.1% 12/2022; 6.4% 7/2023; 6.3% 12/2023; 6.4% 6/2024; 6.3% 12/2024; 5.9% 2/2025; 5.8% 3/2025; 5.6% POC Today     Dietary compliance: Good, CHO counting  Exercise: No, active   Polyuria/polydipsia: No  Blurred vision: No    Episodes of hypoglycemia: Yes, potentially due to overcorrecting  Blood Glucose:  Checking 3-4 times per day    Medications for DM   MedSimplify 780G pump     Pump Settings     Basal:  12A 1.55-->1.65  3A 1.85-->2.00  6A 1.90-->2.00  9A 1.60  12P 1.50  2P 1.90  5P 2.10  9P 1.70    Bolus:  I:CR   12A 5.8-->5.0  10:30A 5.0  4P 3.5-->3.0    Sensitivity  12A 20  9P 30    Blood Glucose Target  12A   10P     Active Insulin Time 2:30 hours     Metformin d/c'd due to GI intolerance  Victoza stopped due to nausea      REVIEW OF SYSTEMS  Eyes: Diabetic retinopathy present: No            Most recent visit to eye doctor in last 12 months: Yes    CV: Cardiovascular disease present: No         Hypertension present: No         Hyperlipidemia present: No         Peripheral Vascular Disease present: No    : Nephropathy present: No    Neuro: Neuropathy present: Yes    Skin: Infection or ulceration: No    Osteoporosis: No    Thyroid disease: No      Medications:     Current Outpatient Medications:     HUMALOG 100 UNIT/ML Injection Solution, Inject 130 units subcutaneous daily via insulin pump, Disp: 110 mL, Rfl: 1    Ferrous Sulfate ER (SLOW FE) 45 MG Oral Tab CR, , Disp: , Rfl:     Cyanocobalamin (B-12) 1000 MCG Oral Tab, , Disp: , Rfl:      Coenzyme Q10 (COQ10) 200 MG Oral Cap, , Disp: , Rfl:     Cholecalciferol (VITAMIN D3) 75 MCG (3000 UT) Oral Tab, , Disp: , Rfl:     aspirin (PIERCE LOW DOSE) 81 MG Oral Tab EC, , Disp: , Rfl:     Glucose Blood (ACCU-CHEK GUIDE TEST) In Vitro Strip, Check blood sugar 4 times daily Dx:E10.65, Disp: 400 each, Rfl: 0    Insulin Pen Needle (NOVOFINE PEN NEEDLE) 32G X 6 MM Does not apply Misc, in the morning, at noon, in the evening, and at bedtime. 1 each in the morning, at noon, in the evening, and at bedtime., Disp: 100 each, Rfl: 0    insulin glargine (LANTUS SOLOSTAR) 100 UNIT/ML Subcutaneous Solution Pen-injector, Inject 40 Units into the skin nightly., Disp: 12 mL, Rfl: 0    Insulin Pen Needle (BD PEN NEEDLE JOSEPH U/F) 32G X 4 MM Does not apply Misc, Use as directed to inject insulin, Disp: 100 each, Rfl: 0    Prenatal 27-0.8 MG Oral Tab, Take 1 tablet by mouth daily., Disp: , Rfl:      Allergies:   No Known Allergies    Social History:   Social History     Socioeconomic History    Marital status:    Tobacco Use    Smoking status: Never    Smokeless tobacco: Never   Vaping Use    Vaping status: Never Used   Substance and Sexual Activity    Alcohol use: Yes     Comment: rarely    Drug use: Yes     Types: Cannabis       Medical History:   Diabetes Mellitus Type 1    Surgical history:   No past surgical history on file.      PHYSICAL EXAM  /78   Pulse 90   Wt 262 lb (118.8 kg)   BMI 38.69 kg/m²     General Appearance:  alert, well developed, in no acute distress  Eyes:  normal conjunctivae, sclera., normal sclera and normal pupils  Ears/Nose/Mouth/Throat/Neck:  no palpable thyroid nodules or cervical lymphadenopathy  Back: no kyphosis or back tenderness  Skin:  normal moisture and skin texture  Hair & Nails:  normal scalp hair     Hematologic:  no excessive bruising  Neuro:  sensory grossly intact and motor grossly intact  Psychiatric:  oriented to time, self, and place  Nutritional:  no abnormal  weight gain or loss      ASSESSMENT/PLAN:      1. Diabetes Mellitus Type 1, controlled  -controlled, HgA1c 5.6% -->Improved   -Now pregnant 23 weeks   -Discussed importance of glycemic control to prevent complications of diabetes  -Discussed complications of diabetes include retinopathy, neuropathy, nephropathy and cardiovascular disease  -Discussed importance of SBGM  -Discussed importance of CHO counting  -Continue Medtronic 780 pump   -Adjusted insulin pump as noted above   -Normal lipids   -Normal thyroid levels   -UTD with optho  -Normal renal and liver function   -Normotensive   -Normal foot exam performed 12/2024   -Check Thyroid US given paternal h/o thyroid cancer -->on hold given pregnancy     A total of 30 minutes was spent on obtaining history, reviewing pertinent labs, evaluating patient, providing multiple treatment options, reinforcing diet/exercise and compliance, and completing documentation.     RTC 6 weeks     4/30/2025  Aleta Servin MD

## 2025-05-27 ENCOUNTER — PATIENT MESSAGE (OUTPATIENT)
Dept: ENDOCRINOLOGY CLINIC | Facility: CLINIC | Age: 35
End: 2025-05-27

## 2025-05-30 NOTE — TELEPHONE ENCOUNTER
Dr. Servin,  See  message - report sent via webex  No faxes received from [employer] at this time    Please advise -thanks

## 2025-06-11 ENCOUNTER — OFF PREMISE (OUTPATIENT)
Dept: PEDIATRIC CARDIOLOGY | Age: 35
End: 2025-06-11

## 2025-06-11 DIAGNOSIS — O24.012: Primary | ICD-10-CM

## 2025-06-17 ENCOUNTER — TELEPHONE (OUTPATIENT)
Dept: ENDOCRINOLOGY CLINIC | Facility: CLINIC | Age: 35
End: 2025-06-17

## 2025-06-17 NOTE — TELEPHONE ENCOUNTER
Received prescription fax from [employer]. Placed in providers folder for signature.

## 2025-06-20 NOTE — TELEPHONE ENCOUNTER
[employer] form signed and faxed, received fax confirmation    Form located in brown folder on my desk

## 2025-06-25 ENCOUNTER — OFFICE VISIT (OUTPATIENT)
Dept: ENDOCRINOLOGY CLINIC | Facility: CLINIC | Age: 35
End: 2025-06-25
Payer: COMMERCIAL

## 2025-06-25 VITALS
HEART RATE: 106 BPM | BODY MASS INDEX: 40 KG/M2 | WEIGHT: 272 LBS | DIASTOLIC BLOOD PRESSURE: 85 MMHG | SYSTOLIC BLOOD PRESSURE: 126 MMHG

## 2025-06-25 DIAGNOSIS — E10.65 UNCONTROLLED TYPE 1 DIABETES MELLITUS WITH HYPERGLYCEMIA (HCC): Primary | ICD-10-CM

## 2025-06-25 LAB
GLUCOSE BLOOD: 151
HEMOGLOBIN A1C: 6.3 % (ref 4.3–5.6)
TEST STRIP LOT #: NORMAL NUMERIC

## 2025-06-25 PROCEDURE — 82947 ASSAY GLUCOSE BLOOD QUANT: CPT | Performed by: INTERNAL MEDICINE

## 2025-06-25 PROCEDURE — 83036 HEMOGLOBIN GLYCOSYLATED A1C: CPT | Performed by: INTERNAL MEDICINE

## 2025-06-25 PROCEDURE — 99214 OFFICE O/P EST MOD 30 MIN: CPT | Performed by: INTERNAL MEDICINE

## 2025-06-25 RX ORDER — INSULIN LISPRO 200 [IU]/ML
60 INJECTION, SOLUTION SUBCUTANEOUS 3 TIMES DAILY
Qty: 30 ML | Refills: 1 | Status: SHIPPED | OUTPATIENT
Start: 2025-06-25

## 2025-06-25 RX ORDER — PEN NEEDLE, DIABETIC 32GX 5/32"
NEEDLE, DISPOSABLE MISCELLANEOUS
Qty: 100 EACH | Refills: 1 | Status: SHIPPED | OUTPATIENT
Start: 2025-06-25

## 2025-06-25 NOTE — PROGRESS NOTES
Name: Maranda Gauthier  Date: 6/25/2025      HISTORY OF PRESENT ILLNESS   Maranda Gauthier is a 34 year old female who presents for diabetes mellitus, diagnosed at age 12.     She is pregnant, currently 30 weeks gestation.  Baby boy.  She is struggling with significant insulin resistance.     Prior HbA, C or glycohemoglobin were 8.2% 8/2018; 6.8% 11/2018; 7.1% 2/2019; 6.8% 5/2019; 7.3% 8/2019; 6.8% 11/2019; 6.4% 8/2020; 6.5% 2/2021; 5.8% 8/2021; 6.2% 1/2022; 6.1% 5/2022; 6.5% 8/2022; 6.1% 12/2022; 6.4% 7/2023; 6.3% 12/2023; 6.4% 6/2024; 6.3% 12/2024; 5.9% 2/2025; 5.8% 3/2025; 5.6% 4/2025; 6.3% POC Today     Dietary compliance: Good, CHO counting  Exercise: No, active   Polyuria/polydipsia: No  Blurred vision: No    Episodes of hypoglycemia: Yes, potentially due to overcorrecting  Blood Glucose:  Checking 3-4 times per day    Medications for DM   Medtronic 780G pump     Pump Settings     Basal:  12A 1.65  3A 2.00  6A 2.00  9A 1.60  12P 1.50  2P 1.90  5P 2.10  9P 1.70    Bolus:  I:CR   12A 3.5  10:30A 3.5  4P 2.0    Sensitivity  12A 20  9P 30    Blood Glucose Target  12A   10P     CHANGE TO U200 Insulin New Settings     Basal  12A 1.0  3A 1.9  9A 1.5  8P 1.0     Bolus:   I:CR   12A 6  4P 3  8P 8    CF   12A 20  8P 40     Active Insulin Time 2:00 hours     Metformin d/c'd due to GI intolerance  Victoza stopped due to nausea      REVIEW OF SYSTEMS  Reviewed and updated during OV 6/2025    Eyes: Diabetic retinopathy present: No            Most recent visit to eye doctor in last 12 months: Yes    CV: Cardiovascular disease present: No         Hypertension present: No         Hyperlipidemia present: No         Peripheral Vascular Disease present: No    : Nephropathy present: No    Neuro: Neuropathy present: Yes    Skin: Infection or ulceration: No    Osteoporosis: No    Thyroid disease: No      Medications:     Current Outpatient Medications:     HUMALOG 100 UNIT/ML Injection Solution, Inject 130  units subcutaneous daily via insulin pump, Disp: 110 mL, Rfl: 1    Ferrous Sulfate ER (SLOW FE) 45 MG Oral Tab CR, , Disp: , Rfl:     Cyanocobalamin (B-12) 1000 MCG Oral Tab, , Disp: , Rfl:     Coenzyme Q10 (COQ10) 200 MG Oral Cap, , Disp: , Rfl:     Cholecalciferol (VITAMIN D3) 75 MCG (3000 UT) Oral Tab, , Disp: , Rfl:     aspirin (PIERCE LOW DOSE) 81 MG Oral Tab EC, , Disp: , Rfl:     Glucose Blood (ACCU-CHEK GUIDE TEST) In Vitro Strip, Check blood sugar 4 times daily Dx:E10.65, Disp: 400 each, Rfl: 0    Insulin Pen Needle (NOVOFINE PEN NEEDLE) 32G X 6 MM Does not apply Misc, in the morning, at noon, in the evening, and at bedtime. 1 each in the morning, at noon, in the evening, and at bedtime., Disp: 100 each, Rfl: 0    insulin glargine (LANTUS SOLOSTAR) 100 UNIT/ML Subcutaneous Solution Pen-injector, Inject 40 Units into the skin nightly., Disp: 12 mL, Rfl: 0    Prenatal 27-0.8 MG Oral Tab, Take 1 tablet by mouth daily., Disp: , Rfl:     Insulin Pen Needle (BD PEN NEEDLE JOSEPH U/F) 32G X 4 MM Does not apply Misc, Use as directed to inject insulin, Disp: 100 each, Rfl: 0     Allergies:   No Known Allergies    Social History:   Social History     Socioeconomic History    Marital status:    Tobacco Use    Smoking status: Never    Smokeless tobacco: Never   Vaping Use    Vaping status: Never Used   Substance and Sexual Activity    Alcohol use: Yes     Comment: rarely    Drug use: Yes     Types: Cannabis       Medical History:   Diabetes Mellitus Type 1    Surgical history:   No past surgical history on file.      PHYSICAL EXAM  /85   Pulse 106   Wt 272 lb (123.4 kg)   BMI 40.17 kg/m²     General Appearance:  alert, well developed, in no acute distress  Eyes:  normal conjunctivae, sclera., normal sclera and normal pupils  Ears/Nose/Mouth/Throat/Neck:  no palpable thyroid nodules or cervical lymphadenopathy   Back: no kyphosis or back tenderness  Skin:  normal moisture and skin texture  Hair & Nails:   normal scalp hair     Hematologic:  no excessive bruising  Neuro:  sensory grossly intact and motor grossly intact  Psychiatric:  oriented to time, self, and place  Nutritional:  no abnormal weight gain or loss      ASSESSMENT/PLAN:      1. Diabetes Mellitus Type 1, controlled  -controlled, HgA1c 6.3 -->Increased since last visit   -Now pregnant 30 weeks gestation   -Discussed importance of glycemic control to prevent complications of diabetes  -Discussed complications of diabetes include retinopathy, neuropathy, nephropathy and cardiovascular disease  -Discussed importance of SBGM  -Discussed importance of CHO counting  -Continue Medtronic 780 pump   -Change to Humalog U200 insulin  -See new settings listed above for concentrated insulin   -Normal lipids   -Normal thyroid levels   -UTD with optho  -Normal renal and liver function   -Normotensive   -Normal foot exam performed 12/2024   -Check Thyroid US given paternal h/o thyroid cancer -->on hold given pregnancy     A total of 30 minutes was spent on obtaining history, reviewing pertinent labs, evaluating patient, providing multiple treatment options, reinforcing diet/exercise and compliance, and completing documentation.     RTC  4 weeks     6/25/2025  Aleta Servin MD

## 2025-06-25 NOTE — PATIENT INSTRUCTIONS
Bolus:    I:CR   12A 6  4P 3  8P 8    CF   12A 20  8P 40     Post delivery     I:CR 7    CF 40

## 2025-06-27 ENCOUNTER — PATIENT MESSAGE (OUTPATIENT)
Dept: ENDOCRINOLOGY CLINIC | Facility: CLINIC | Age: 35
End: 2025-06-27

## 2025-06-27 NOTE — TELEPHONE ENCOUNTER
Last visit with Dr Servin on 6/25/25  CHANGE TO U200 Insulin New Settings      Basal  12A 1.0  3A 1.9 --> 2  9A 1.5  8P 1.0      Bolus:   I:CR   12A 6  4P 3  8P 8     CF   12A 20  8P 40      Active Insulin Time 2:00 hours     Patient is 30 weeks pregnant. Started U200 on Wednesday dinner per [facility]. Spoke with patient and basal settings adjusted as above. She explained she has been giving several corrections to bring BG down, especially in the early morning. She feels carb counting is accurate and needs a little more support on basal settings. Patient feels comfortable with changes and will call back on Monday if she feels it needs further adjusting or sooner if urgent. Patient as follow up with Dr. Servin in one month.

## 2025-07-03 ENCOUNTER — PATIENT MESSAGE (OUTPATIENT)
Dept: ENDOCRINOLOGY CLINIC | Facility: CLINIC | Age: 35
End: 2025-07-03

## 2025-07-21 RX ORDER — INSULIN LISPRO 200 [IU]/ML
60 INJECTION, SOLUTION SUBCUTANEOUS 3 TIMES DAILY
Qty: 30 ML | Refills: 1 | Status: SHIPPED | OUTPATIENT
Start: 2025-07-21

## 2025-07-21 NOTE — TELEPHONE ENCOUNTER
Endocrine refill protocol for rapid acting, regular, intermediate, and mixed insulin:    Protocol Criteria:  PASSED     If all below requirements are met, send a 90-day supply with 1 refill per provider protocol.    Verify appointment with Endocrinology completed in the last 6 months or scheduled in the next 3 months.  Verify A1C has been completed within the last 6 months and is below 8.5%    -May substitute prescriptions for Novolog and Humalog unless documented allergy (pens and vials) at the same dose and concentration per insurance preference and provider protocol.   -May substitute prescriptions for Novolin R and Humulin R unless documented allergy (pens and vials) at the same dose and concentration per insurance preference and provider protocol.   -May substitute prescriptions for Novolin N and Humulin N unless documented allergy (pens and vials) at the same dose and concentration per insurance preference and provider protocol.   -May substitute prescriptions for Humulin and Novolin 70/30 insulin unless documented allergy at the same dose and concentration per insurance preference and provider protocol.    Last completed office visit:6/25/2025 Aleta Servin MD     Next scheduled Follow up:   Future Appointments   Date Time Provider Department Center   7/30/2025 12:15 PM Aleta Servin MD [facility]      Last A1C result: 6.3% done 6/25/2025.

## 2025-07-30 ENCOUNTER — OFFICE VISIT (OUTPATIENT)
Dept: ENDOCRINOLOGY CLINIC | Facility: CLINIC | Age: 35
End: 2025-07-30
Payer: COMMERCIAL

## 2025-07-30 VITALS
BODY MASS INDEX: 42 KG/M2 | HEART RATE: 87 BPM | SYSTOLIC BLOOD PRESSURE: 128 MMHG | DIASTOLIC BLOOD PRESSURE: 83 MMHG | WEIGHT: 285 LBS

## 2025-07-30 DIAGNOSIS — E10.65 UNCONTROLLED TYPE 1 DIABETES MELLITUS WITH HYPERGLYCEMIA (HCC): Primary | ICD-10-CM

## 2025-07-30 LAB
GLUCOSE BLOOD: 84
HEMOGLOBIN A1C: 6.3 % (ref 4.3–5.6)

## 2025-07-30 PROCEDURE — 99214 OFFICE O/P EST MOD 30 MIN: CPT | Performed by: INTERNAL MEDICINE

## 2025-07-30 PROCEDURE — 82947 ASSAY GLUCOSE BLOOD QUANT: CPT | Performed by: INTERNAL MEDICINE

## 2025-07-30 PROCEDURE — 83036 HEMOGLOBIN GLYCOSYLATED A1C: CPT | Performed by: INTERNAL MEDICINE

## (undated) NOTE — LETTER
9/5/2018              [facility]         Dear Drake Bacon,    [facility] records indicate that the tests ordered for you by Mick Wells MD  have not been done.   If you have, in fact, already completed the te